# Patient Record
Sex: MALE | Race: WHITE | Employment: FULL TIME | URBAN - METROPOLITAN AREA
[De-identification: names, ages, dates, MRNs, and addresses within clinical notes are randomized per-mention and may not be internally consistent; named-entity substitution may affect disease eponyms.]

---

## 2019-02-06 ENCOUNTER — PATIENT OUTREACH (OUTPATIENT)
Dept: FAMILY MEDICINE CLINIC | Facility: CLINIC | Age: 49
End: 2019-02-06

## 2019-04-30 ENCOUNTER — TELEPHONE (OUTPATIENT)
Dept: FAMILY MEDICINE CLINIC | Facility: CLINIC | Age: 49
End: 2019-04-30

## 2019-05-01 ENCOUNTER — TELEPHONE (OUTPATIENT)
Dept: FAMILY MEDICINE CLINIC | Facility: CLINIC | Age: 49
End: 2019-05-01

## 2019-05-01 NOTE — TELEPHONE ENCOUNTER
Spoke with pt and he verified that Dr. Lopez Arthur is still his PCP. He is also aware that he is due for a physical and will be calling back to schedule an appointment, because he is currently working.

## 2020-08-12 ENCOUNTER — OFFICE VISIT (OUTPATIENT)
Dept: FAMILY MEDICINE CLINIC | Facility: CLINIC | Age: 50
End: 2020-08-12
Payer: COMMERCIAL

## 2020-08-12 VITALS
SYSTOLIC BLOOD PRESSURE: 118 MMHG | WEIGHT: 193 LBS | HEIGHT: 66 IN | TEMPERATURE: 99 F | BODY MASS INDEX: 31.02 KG/M2 | HEART RATE: 78 BPM | RESPIRATION RATE: 16 BRPM | OXYGEN SATURATION: 98 % | DIASTOLIC BLOOD PRESSURE: 82 MMHG

## 2020-08-12 DIAGNOSIS — Z00.00 ANNUAL PHYSICAL EXAM: Primary | ICD-10-CM

## 2020-08-12 DIAGNOSIS — Z20.822 CLOSE EXPOSURE TO COVID-19 VIRUS: ICD-10-CM

## 2020-08-12 DIAGNOSIS — E78.2 MIXED HYPERLIPIDEMIA: ICD-10-CM

## 2020-08-12 DIAGNOSIS — Z00.00 LABORATORY EXAMINATION ORDERED AS PART OF A ROUTINE GENERAL MEDICAL EXAMINATION: ICD-10-CM

## 2020-08-12 DIAGNOSIS — E78.5 HYPERLIPIDEMIA WITH TARGET LOW DENSITY LIPOPROTEIN (LDL) CHOLESTEROL LESS THAN 130 MG/DL: ICD-10-CM

## 2020-08-12 DIAGNOSIS — R53.83 OTHER FATIGUE: ICD-10-CM

## 2020-08-12 DIAGNOSIS — Z80.42 FAMILY HISTORY OF PROSTATE CANCER: ICD-10-CM

## 2020-08-12 DIAGNOSIS — Z12.5 SCREENING PSA (PROSTATE SPECIFIC ANTIGEN): ICD-10-CM

## 2020-08-12 PROCEDURE — 3074F SYST BP LT 130 MM HG: CPT | Performed by: FAMILY MEDICINE

## 2020-08-12 PROCEDURE — 3008F BODY MASS INDEX DOCD: CPT | Performed by: FAMILY MEDICINE

## 2020-08-12 PROCEDURE — 99396 PREV VISIT EST AGE 40-64: CPT | Performed by: FAMILY MEDICINE

## 2020-08-12 PROCEDURE — 99214 OFFICE O/P EST MOD 30 MIN: CPT | Performed by: FAMILY MEDICINE

## 2020-08-12 PROCEDURE — 3079F DIAST BP 80-89 MM HG: CPT | Performed by: FAMILY MEDICINE

## 2020-08-13 ENCOUNTER — APPOINTMENT (OUTPATIENT)
Dept: LAB | Age: 50
End: 2020-08-13
Attending: FAMILY MEDICINE
Payer: COMMERCIAL

## 2020-08-13 DIAGNOSIS — Z20.822 CLOSE EXPOSURE TO COVID-19 VIRUS: ICD-10-CM

## 2020-08-13 DIAGNOSIS — R53.83 OTHER FATIGUE: ICD-10-CM

## 2020-08-13 DIAGNOSIS — Z12.5 SCREENING PSA (PROSTATE SPECIFIC ANTIGEN): ICD-10-CM

## 2020-08-13 LAB
ALBUMIN SERPL-MCNC: 4.2 G/DL (ref 3.4–5)
ALBUMIN/GLOB SERPL: 1.3 {RATIO} (ref 1–2)
ALP LIVER SERPL-CCNC: 69 U/L (ref 45–117)
ALT SERPL-CCNC: 36 U/L (ref 16–61)
ANION GAP SERPL CALC-SCNC: 3 MMOL/L (ref 0–18)
AST SERPL-CCNC: 16 U/L (ref 15–37)
BASOPHILS # BLD AUTO: 0.08 X10(3) UL (ref 0–0.2)
BASOPHILS NFR BLD AUTO: 1.2 %
BILIRUB SERPL-MCNC: 0.5 MG/DL (ref 0.1–2)
BUN BLD-MCNC: 19 MG/DL (ref 7–18)
BUN/CREAT SERPL: 18.6 (ref 10–20)
CALCIUM BLD-MCNC: 9 MG/DL (ref 8.5–10.1)
CHLORIDE SERPL-SCNC: 108 MMOL/L (ref 98–112)
CHOLEST SMN-MCNC: 199 MG/DL (ref ?–200)
CO2 SERPL-SCNC: 29 MMOL/L (ref 21–32)
COMPLEXED PSA SERPL-MCNC: 1.73 NG/ML (ref ?–4)
CREAT BLD-MCNC: 1.02 MG/DL (ref 0.7–1.3)
DEPRECATED RDW RBC AUTO: 43.3 FL (ref 35.1–46.3)
EOSINOPHIL # BLD AUTO: 0.3 X10(3) UL (ref 0–0.7)
EOSINOPHIL NFR BLD AUTO: 4.6 %
ERYTHROCYTE [DISTWIDTH] IN BLOOD BY AUTOMATED COUNT: 12.5 % (ref 11–15)
FOLATE SERPL-MCNC: 10.5 NG/ML (ref 8.7–?)
GLOBULIN PLAS-MCNC: 3.2 G/DL (ref 2.8–4.4)
GLUCOSE BLD-MCNC: 105 MG/DL (ref 70–99)
HCT VFR BLD AUTO: 45.9 % (ref 39–53)
HDLC SERPL-MCNC: 35 MG/DL (ref 40–59)
HGB BLD-MCNC: 15.4 G/DL (ref 13–17.5)
IMM GRANULOCYTES # BLD AUTO: 0.02 X10(3) UL (ref 0–1)
IMM GRANULOCYTES NFR BLD: 0.3 %
LDLC SERPL CALC-MCNC: 136 MG/DL (ref ?–100)
LYMPHOCYTES # BLD AUTO: 2.08 X10(3) UL (ref 1–4)
LYMPHOCYTES NFR BLD AUTO: 32.1 %
M PROTEIN MFR SERPL ELPH: 7.4 G/DL (ref 6.4–8.2)
MCH RBC QN AUTO: 31.5 PG (ref 26–34)
MCHC RBC AUTO-ENTMCNC: 33.6 G/DL (ref 31–37)
MCV RBC AUTO: 93.9 FL (ref 80–100)
MONOCYTES # BLD AUTO: 0.53 X10(3) UL (ref 0.1–1)
MONOCYTES NFR BLD AUTO: 8.2 %
NEUTROPHILS # BLD AUTO: 3.47 X10 (3) UL (ref 1.5–7.7)
NEUTROPHILS # BLD AUTO: 3.47 X10(3) UL (ref 1.5–7.7)
NEUTROPHILS NFR BLD AUTO: 53.6 %
NONHDLC SERPL-MCNC: 164 MG/DL (ref ?–130)
OSMOLALITY SERPL CALC.SUM OF ELEC: 293 MOSM/KG (ref 275–295)
PATIENT FASTING Y/N/NP: YES
PATIENT FASTING Y/N/NP: YES
PLATELET # BLD AUTO: 371 10(3)UL (ref 150–450)
POTASSIUM SERPL-SCNC: 4.2 MMOL/L (ref 3.5–5.1)
RBC # BLD AUTO: 4.89 X10(6)UL (ref 4.3–5.7)
SARS-COV-2 IGG SERPLBLD QL IA.RAPID: NEGATIVE
SODIUM SERPL-SCNC: 140 MMOL/L (ref 136–145)
T4 FREE SERPL-MCNC: 1 NG/DL (ref 0.8–1.7)
TRIGL SERPL-MCNC: 140 MG/DL (ref 30–149)
TSI SER-ACNC: 0.72 MIU/ML (ref 0.36–3.74)
VIT B12 SERPL-MCNC: 332 PG/ML (ref 193–986)
VLDLC SERPL CALC-MCNC: 28 MG/DL (ref 0–30)
WBC # BLD AUTO: 6.5 X10(3) UL (ref 4–11)

## 2020-08-13 PROCEDURE — 86769 SARS-COV-2 COVID-19 ANTIBODY: CPT | Performed by: FAMILY MEDICINE

## 2020-08-13 PROCEDURE — 84403 ASSAY OF TOTAL TESTOSTERONE: CPT | Performed by: FAMILY MEDICINE

## 2020-08-13 PROCEDURE — G0103 PSA SCREENING: HCPCS | Performed by: FAMILY MEDICINE

## 2020-08-13 PROCEDURE — 36415 COLL VENOUS BLD VENIPUNCTURE: CPT | Performed by: FAMILY MEDICINE

## 2020-08-13 PROCEDURE — 84402 ASSAY OF FREE TESTOSTERONE: CPT | Performed by: FAMILY MEDICINE

## 2020-08-13 PROCEDURE — 84439 ASSAY OF FREE THYROXINE: CPT | Performed by: FAMILY MEDICINE

## 2020-08-14 NOTE — PROGRESS NOTES
Deepa King is a 54year old male. HPI:   Patient presenting for annual physical and f/u with fatigue, worsening fatigue. Patient reports worsening epigastric pain with radiation to his back.   Patient reports it's worse after meals and better w on exertion  GI: denies abdominal pain and he reports heartburn  NEURO: denies headaches  PSYCH:  Appropriate mood and what's mentioned above. Anxiety as stated above too. Denies depressed mood.      EXAM:   /82   Pulse 78   Temp 98.9 °F (37.2 °C) ( Meds & Refills for this Visit:  Requested Prescriptions      No prescriptions requested or ordered in this encounter          The patient indicates understanding of these issues and agrees to the plan. The patient is asked to return in 1-2 weeks.

## 2020-08-18 ENCOUNTER — PATIENT MESSAGE (OUTPATIENT)
Dept: FAMILY MEDICINE CLINIC | Facility: CLINIC | Age: 50
End: 2020-08-18

## 2020-08-18 ENCOUNTER — TELEPHONE (OUTPATIENT)
Dept: FAMILY MEDICINE CLINIC | Facility: CLINIC | Age: 50
End: 2020-08-18

## 2020-08-18 NOTE — TELEPHONE ENCOUNTER
From: Bruce Hernadez  To:  Cheli Donato MD  Sent: 8/18/2020 9:09 AM CDT  Subject: Test Results Question    I have a question about VITAMIN B12 resulted on 8/13/20, 1:12 PM.    Good morning doctor     Which one of these test are the results for testosteron

## 2020-08-18 NOTE — TELEPHONE ENCOUNTER
----- Message from Anastasia Cheadle, MD sent at 8/17/2020 12:56 PM CDT -----  B12 is low. Needs b12 1000 mcg IM every other week for 3 months, then once a month until recheck b12 and folate in 6 months.      Also, let him know that I recommend he see Yakov nj

## 2020-08-18 NOTE — TELEPHONE ENCOUNTER
----- Message from Mayela Charlton MD sent at 8/17/2020 12:57 PM CDT -----  Negative immunity covid.

## 2020-08-18 NOTE — TELEPHONE ENCOUNTER
Spoke to pt with test results/instructions. Gave pt Dany's contact information [phone]. Pt scheduled for nurse visit for tomorrow.   He would like to see if he can learn how to give the B12 injection to himself then moving forward he will do them at

## 2020-08-19 ENCOUNTER — NURSE ONLY (OUTPATIENT)
Dept: FAMILY MEDICINE CLINIC | Facility: CLINIC | Age: 50
End: 2020-08-19
Payer: COMMERCIAL

## 2020-08-19 ENCOUNTER — TELEPHONE (OUTPATIENT)
Dept: FAMILY MEDICINE CLINIC | Facility: CLINIC | Age: 50
End: 2020-08-19

## 2020-08-19 DIAGNOSIS — E53.8 VITAMIN B12 DEFICIENCY: Primary | ICD-10-CM

## 2020-08-19 DIAGNOSIS — R79.89 LOW TESTOSTERONE IN MALE: Primary | ICD-10-CM

## 2020-08-19 LAB
TESTOSTERONE TOTAL: 236.8 NG/DL
TESTOSTERONE, FREE -MS/MS: 66.2 PG/ML

## 2020-08-19 PROCEDURE — 96372 THER/PROPH/DIAG INJ SC/IM: CPT | Performed by: FAMILY MEDICINE

## 2020-08-19 RX ORDER — CYANOCOBALAMIN 1000 UG/ML
1000 INJECTION INTRAMUSCULAR; SUBCUTANEOUS ONCE
Status: COMPLETED | OUTPATIENT
Start: 2020-08-19 | End: 2020-08-19

## 2020-08-19 RX ADMIN — CYANOCOBALAMIN 1000 MCG: 1000 INJECTION INTRAMUSCULAR; SUBCUTANEOUS at 15:55:00

## 2020-08-19 NOTE — TELEPHONE ENCOUNTER
Patient was seen in office for a nurse visit and got a b12 shot. Patient asked for medication to be sent to his pharmacy so he can administer at home. Please advise.

## 2020-08-19 NOTE — TELEPHONE ENCOUNTER
----- Message from Cheli Donato MD sent at 8/19/2020 12:46 PM CDT -----  Low normal. Ok for treatment if he prefers, refer to [facility].     tracy sent and referral in system

## 2020-08-20 RX ORDER — CYANOCOBALAMIN 1000 UG/ML
INJECTION INTRAMUSCULAR; SUBCUTANEOUS
Qty: 9 ML | Refills: 0 | Status: SHIPPED | OUTPATIENT
Start: 2020-08-20 | End: 2020-12-01

## 2020-09-14 ENCOUNTER — TELEPHONE (OUTPATIENT)
Dept: SURGERY | Facility: CLINIC | Age: 50
End: 2020-09-14

## 2020-09-14 ENCOUNTER — OFFICE VISIT (OUTPATIENT)
Dept: SURGERY | Facility: CLINIC | Age: 50
End: 2020-09-14
Payer: COMMERCIAL

## 2020-09-14 VITALS — HEART RATE: 84 BPM | SYSTOLIC BLOOD PRESSURE: 127 MMHG | DIASTOLIC BLOOD PRESSURE: 87 MMHG

## 2020-09-14 DIAGNOSIS — Z80.42 FAMILY HISTORY OF PROSTATE CANCER: ICD-10-CM

## 2020-09-14 DIAGNOSIS — R06.83 SNORING: ICD-10-CM

## 2020-09-14 DIAGNOSIS — E29.1 HYPOGONADISM IN MALE: Primary | ICD-10-CM

## 2020-09-14 LAB
MULTISTIX LOT#: 1044 NUMERIC
PH, URINE: 7.5 (ref 4.5–8)
SPECIFIC GRAVITY: 1.02 (ref 1–1.03)
URINE-COLOR: YELLOW
UROBILINOGEN,SEMI-QN: 1 MG/DL (ref 0–1.9)

## 2020-09-14 PROCEDURE — 81003 URINALYSIS AUTO W/O SCOPE: CPT | Performed by: UROLOGY

## 2020-09-14 PROCEDURE — 3079F DIAST BP 80-89 MM HG: CPT | Performed by: UROLOGY

## 2020-09-14 PROCEDURE — 3074F SYST BP LT 130 MM HG: CPT | Performed by: UROLOGY

## 2020-09-14 PROCEDURE — 99244 OFF/OP CNSLTJ NEW/EST MOD 40: CPT | Performed by: UROLOGY

## 2020-09-14 RX ORDER — TESTOSTERONE CYPIONATE 200 MG/ML
1 VIAL (ML) INTRAMUSCULAR
Qty: 2 ML | Refills: 5 | Status: SHIPPED | OUTPATIENT
Start: 2020-09-14 | End: 2021-02-12
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
## 2020-09-15 NOTE — TELEPHONE ENCOUNTER
RN called [address] Spoke to Deja Weir. She was asking if instead of Syringe 22Gx1 3ml (use to draw), if they can dispense Syringe 22Gx1. 5 3ml instead. RN approved the changed.

## 2020-09-17 ENCOUNTER — LAB ENCOUNTER (OUTPATIENT)
Dept: LAB | Age: 50
End: 2020-09-17
Attending: UROLOGY
Payer: COMMERCIAL

## 2020-09-17 DIAGNOSIS — E29.1 MALE HYPOGONADISM: Primary | ICD-10-CM

## 2020-09-17 LAB
DEPRECATED RDW RBC AUTO: 41 FL (ref 35.1–46.3)
ERYTHROCYTE [DISTWIDTH] IN BLOOD BY AUTOMATED COUNT: 12.4 % (ref 11–15)
ESTRADIOL SERPL-MCNC: 43.3 PG/ML (ref 11–52.5)
HCT VFR BLD AUTO: 43.9 % (ref 39–53)
HGB BLD-MCNC: 15.3 G/DL (ref 13–17.5)
MCH RBC QN AUTO: 31.7 PG (ref 26–34)
MCHC RBC AUTO-ENTMCNC: 34.9 G/DL (ref 31–37)
MCV RBC AUTO: 90.9 FL (ref 80–100)
PLATELET # BLD AUTO: 361 10(3)UL (ref 150–450)
PSA SERPL-MCNC: 1.53 NG/ML (ref ?–4)
RBC # BLD AUTO: 4.83 X10(6)UL (ref 4.3–5.7)
TESTOST SERPL-MCNC: 171.86 NG/DL
WBC # BLD AUTO: 6.5 X10(3) UL (ref 4–11)

## 2020-09-17 PROCEDURE — 82670 ASSAY OF TOTAL ESTRADIOL: CPT | Performed by: UROLOGY

## 2020-09-17 PROCEDURE — 36415 COLL VENOUS BLD VENIPUNCTURE: CPT | Performed by: UROLOGY

## 2020-09-17 PROCEDURE — 84403 ASSAY OF TOTAL TESTOSTERONE: CPT

## 2020-09-17 PROCEDURE — 85027 COMPLETE CBC AUTOMATED: CPT | Performed by: UROLOGY

## 2020-09-17 PROCEDURE — 84153 ASSAY OF PSA TOTAL: CPT | Performed by: UROLOGY

## 2020-09-29 ENCOUNTER — TELEPHONE (OUTPATIENT)
Dept: SURGERY | Facility: CLINIC | Age: 50
End: 2020-09-29

## 2020-09-29 NOTE — TELEPHONE ENCOUNTER
Rn received a fax letter from [address] [place] [address] the clinic that the Sleep studis (service) requested have NOT been approved. RN called patient. Left message.

## 2020-10-09 ENCOUNTER — TELEPHONE (OUTPATIENT)
Dept: SURGERY | Facility: CLINIC | Age: 50
End: 2020-10-09

## 2020-10-09 DIAGNOSIS — R79.89 LOW TESTOSTERONE IN MALE: Primary | ICD-10-CM

## 2020-10-09 DIAGNOSIS — F43.10 PTSD (POST-TRAUMATIC STRESS DISORDER): ICD-10-CM

## 2020-10-09 NOTE — TELEPHONE ENCOUNTER
Called patient regarding diagnostic sleep study, please see referral   [id number]  It was denied by health plan see below    Called health plan per katrin richard ref# [id number] Denied per health plan a home sleep requires no auth     Denial fax letter was faxed to provider on 9/25/20       Please advise if home sleep study can be ordered.     Thanks    [facility]

## 2020-10-14 NOTE — TELEPHONE ENCOUNTER
Home sleep study approved left message with patient to contact managed [facility] or ordering physican office    See referral [id number]    Thanks for your patience and cooperation    [facility]- Managed care

## 2020-10-30 ENCOUNTER — PATIENT MESSAGE (OUTPATIENT)
Dept: SURGERY | Facility: CLINIC | Age: 50
End: 2020-10-30

## 2020-11-02 ENCOUNTER — TELEPHONE (OUTPATIENT)
Dept: SURGERY | Facility: CLINIC | Age: 50
End: 2020-11-02

## 2020-11-02 RX ORDER — TESTOSTERONE CYPIONATE 100 MG/ML
100 INJECTION, SOLUTION INTRAMUSCULAR
Qty: 4 ML | Refills: 5 | Status: SHIPPED | OUTPATIENT
Start: 2020-11-02 | End: 2021-04-06

## 2020-11-02 NOTE — TELEPHONE ENCOUNTER
Per Rafe Dakins needs to clarify rx testosterone cypionate 100MG/ML. Past rx was 200mg and wants to verify dr wanted it lowered. Per Rafe Dakins does not come in 100mg, single dose, only 10 mg at a time.  Please advise

## 2020-11-02 NOTE — TELEPHONE ENCOUNTER
Yes, he can cut the dose in half and take once weekly. I sent in a new Rx for the weekly dose he can  from the pharmacy if he needs to.     Thanks,  MPH

## 2020-11-02 NOTE — TELEPHONE ENCOUNTER
From: Danielle Cuevas  To: Anna Bernstein MD  Sent: 10/30/2020 2:14 PM CDT  Subject: Visit Follow-up Question    Good afternoon Doctor,    The shot are working good but only for a short term.  After a bit over a week I get very tired again, to the point wer

## 2020-11-03 NOTE — TELEPHONE ENCOUNTER
Spoke with Leo Toro at [facility] and informed her that the dose of testosterone has been lowered to 100 ml weekly. States the med does not come in a 100mg/1ml vial only 10ml. Okay given to dispense 10ml vial.  Verbalized understanding.

## 2020-12-02 RX ORDER — NEEDLES, SAFETY 18GX1 1/2"
NEEDLE, DISPOSABLE MISCELLANEOUS
Qty: 6 EACH | Refills: 0 | Status: SHIPPED | OUTPATIENT
Start: 2020-12-02 | End: 2021-04-20 | Stop reason: ALTCHOICE

## 2020-12-02 RX ORDER — CYANOCOBALAMIN 1000 UG/ML
INJECTION INTRAMUSCULAR; SUBCUTANEOUS
Qty: 6 ML | Refills: 0 | Status: SHIPPED | OUTPATIENT
Start: 2020-12-02 | End: 2021-02-12

## 2020-12-04 ENCOUNTER — TELEPHONE (OUTPATIENT)
Dept: SURGERY | Facility: CLINIC | Age: 50
End: 2020-12-04

## 2020-12-04 DIAGNOSIS — E29.1 HYPOGONADISM IN MALE: Primary | ICD-10-CM

## 2020-12-04 NOTE — TELEPHONE ENCOUNTER
Patient contacted. Relayed Dr. Pepe Enciso orders/recommendations below to the patient. Patient verbalized understanding and states he will comply. All questions answered.

## 2020-12-04 NOTE — TELEPHONE ENCOUNTER
Hi,  Could you please call this patient and see if he could get the following tests prior to their upcoming appointment. - Testosterone labs    If they can't get this done that's fine.     Thanks,  MPH
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
## 2020-12-10 ENCOUNTER — TELEPHONE (OUTPATIENT)
Dept: SURGERY | Facility: CLINIC | Age: 50
End: 2020-12-10

## 2020-12-10 NOTE — TELEPHONE ENCOUNTER
RN called patient to remind of his appt on 12/14 Monday at 4pm with labs prior. Central Scheduling number to make an appt for his lab draw.

## 2020-12-12 ENCOUNTER — LAB ENCOUNTER (OUTPATIENT)
Dept: LAB | Facility: HOSPITAL | Age: 50
End: 2020-12-12
Attending: UROLOGY
Payer: COMMERCIAL

## 2020-12-12 DIAGNOSIS — E29.1 HYPOGONADISM IN MALE: Primary | ICD-10-CM

## 2020-12-12 PROCEDURE — 82670 ASSAY OF TOTAL ESTRADIOL: CPT | Performed by: UROLOGY

## 2020-12-12 PROCEDURE — 36415 COLL VENOUS BLD VENIPUNCTURE: CPT

## 2020-12-12 PROCEDURE — 84403 ASSAY OF TOTAL TESTOSTERONE: CPT

## 2020-12-12 PROCEDURE — 85027 COMPLETE CBC AUTOMATED: CPT | Performed by: UROLOGY

## 2020-12-14 ENCOUNTER — OFFICE VISIT (OUTPATIENT)
Dept: SURGERY | Facility: CLINIC | Age: 50
End: 2020-12-14
Payer: COMMERCIAL

## 2020-12-14 DIAGNOSIS — R82.90 URINE FINDING: ICD-10-CM

## 2020-12-14 DIAGNOSIS — E29.1 HYPOGONADISM IN MALE: Primary | ICD-10-CM

## 2020-12-14 DIAGNOSIS — F43.10 PTSD (POST-TRAUMATIC STRESS DISORDER): ICD-10-CM

## 2020-12-14 DIAGNOSIS — R06.83 SNORING: ICD-10-CM

## 2020-12-14 DIAGNOSIS — N52.9 ERECTILE DYSFUNCTION, UNSPECIFIED ERECTILE DYSFUNCTION TYPE: ICD-10-CM

## 2020-12-14 PROCEDURE — 81003 URINALYSIS AUTO W/O SCOPE: CPT | Performed by: UROLOGY

## 2020-12-14 PROCEDURE — 99214 OFFICE O/P EST MOD 30 MIN: CPT | Performed by: UROLOGY

## 2020-12-14 RX ORDER — TESTOSTERONE CYPIONATE 100 MG/ML
100 INJECTION, SOLUTION INTRAMUSCULAR
Qty: 4 ML | Refills: 5 | Status: SHIPPED | OUTPATIENT
Start: 2020-12-14 | End: 2021-02-10

## 2020-12-14 RX ORDER — SILDENAFIL 100 MG/1
100 TABLET, FILM COATED ORAL
Qty: 30 TABLET | Refills: 5 | Status: SHIPPED | OUTPATIENT
Start: 2020-12-14 | End: 2020-12-15

## 2020-12-15 DIAGNOSIS — E29.1 HYPOGONADISM IN MALE: ICD-10-CM

## 2020-12-15 DIAGNOSIS — N52.9 ERECTILE DYSFUNCTION, UNSPECIFIED ERECTILE DYSFUNCTION TYPE: ICD-10-CM

## 2020-12-15 RX ORDER — SILDENAFIL 100 MG/1
100 TABLET, FILM COATED ORAL
Qty: 10 TABLET | Refills: 3 | Status: SHIPPED | OUTPATIENT
Start: 2020-12-15 | End: 2021-06-07

## 2020-12-15 RX ORDER — CYANOCOBALAMIN 1000 UG/ML
INJECTION, SOLUTION INTRAMUSCULAR; SUBCUTANEOUS
Qty: 6 ML | Refills: 0 | OUTPATIENT
Start: 2020-12-15

## 2020-12-15 NOTE — TELEPHONE ENCOUNTER
Erectile dysfunction medications    Protocol Criteria:  • Appointment scheduled in the past 12 months or in the next 2 months  • Patient is not on nitrates (Isosorbide, nitroglycerin)    Recent Outpatient Visits            Yesterday Hypogonadism in male

## 2020-12-16 RX ORDER — TESTOSTERONE CYPIONATE 100 MG/ML
100 INJECTION, SOLUTION INTRAMUSCULAR
Qty: 4 ML | Refills: 5 | OUTPATIENT
Start: 2020-12-16 | End: 2021-06-14

## 2020-12-16 NOTE — TELEPHONE ENCOUNTER
Requesting refill of Testosterone IM. [facility] please advise, thanks    LOV: 12/14/2020     - Will refill T, sleep study ordered, CBC in 3 months. F/u in 6 mo with labs prior. 100 mg viagra with coupon sent to [facility].

## 2020-12-16 NOTE — TELEPHONE ENCOUNTER
Pharm confirms that the pt has more refill of Testosterone IM. New refill request will be denied because it is too soon.

## 2020-12-17 ENCOUNTER — PATIENT MESSAGE (OUTPATIENT)
Dept: FAMILY MEDICINE CLINIC | Facility: CLINIC | Age: 50
End: 2020-12-17

## 2020-12-17 DIAGNOSIS — E53.8 VITAMIN B12 DEFICIENCY: Primary | ICD-10-CM

## 2020-12-17 RX ORDER — CYANOCOBALAMIN 1000 UG/ML
INJECTION INTRAMUSCULAR; SUBCUTANEOUS
Qty: 6 ML | Refills: 0 | OUTPATIENT
Start: 2020-12-17

## 2020-12-17 NOTE — TELEPHONE ENCOUNTER
From 8/13/2020 lab result: \"B12 is low.  Needs b12 1000 mcg IM every other week for 3 months, then once a month until recheck b12 and folate in 6 months. \" E94 and folic acid orders placed for Feb 2021. Pt notified via [employer].

## 2020-12-17 NOTE — TELEPHONE ENCOUNTER
From: Nohelia Sal  To: Enrique Spear MD  Sent: 12/17/2020 8:51 AM CST  Subject: Prescription Question    Good morning     I think the b12 is working good and am hoping to continue.  Can you please renew the prescription or was this a temporary thing

## 2021-02-02 ENCOUNTER — OFFICE VISIT (OUTPATIENT)
Dept: SLEEP CENTER | Age: 51
End: 2021-02-02
Attending: UROLOGY
Payer: COMMERCIAL

## 2021-02-02 DIAGNOSIS — F43.10 PTSD (POST-TRAUMATIC STRESS DISORDER): ICD-10-CM

## 2021-02-02 DIAGNOSIS — R79.89 LOW TESTOSTERONE IN MALE: ICD-10-CM

## 2021-02-02 PROCEDURE — 95806 SLEEP STUDY UNATT&RESP EFFT: CPT

## 2021-02-04 NOTE — TELEPHONE ENCOUNTER
Medication(s) to Refill:   Requested Prescriptions     Pending Prescriptions Disp Refills   • cyanocobalamin 1000 MCG/ML Injection Solution 6 mL 0     Si MCG once monthly for 6 months         Reason for Medication Refill being sent to Provider / Frank Amezquita

## 2021-02-05 ENCOUNTER — SLEEP STUDY (OUTPATIENT)
Dept: FAMILY MEDICINE CLINIC | Facility: CLINIC | Age: 51
End: 2021-02-05
Payer: COMMERCIAL

## 2021-02-05 ENCOUNTER — TELEPHONE (OUTPATIENT)
Dept: FAMILY MEDICINE CLINIC | Facility: CLINIC | Age: 51
End: 2021-02-05

## 2021-02-05 DIAGNOSIS — G47.33 OBSTRUCTIVE SLEEP APNEA SYNDROME: Primary | ICD-10-CM

## 2021-02-05 PROCEDURE — 95806 SLEEP STUDY UNATT&RESP EFFT: CPT | Performed by: FAMILY MEDICINE

## 2021-02-05 RX ORDER — CYANOCOBALAMIN 1000 UG/ML
INJECTION INTRAMUSCULAR; SUBCUTANEOUS
Qty: 6 ML | Refills: 0 | OUTPATIENT
Start: 2021-02-05

## 2021-02-05 NOTE — PROGRESS NOTES
Please notify patient sleep study is read. Update flow sheet   Schedule consult   Sleep hygiene should be review to assess factors that may improve sleep quality. Weight management and regular exercise should be initiated or continued to optimal BMI.

## 2021-02-05 NOTE — PROCEDURES
[address] [address],[facility] [address]       Accredited by the [facility] ([facility])    PATIENT'S NAME:        [facility]  ATTENDING PHYSICIAN:   Belia Vigil MD  REFERRING PHYSICIAN:   Angel Luis Mendieta MD  PATIENT 10:38:33  t: 02/05/2021 10:55:38  [facility] [id number]  [facility]/    cc: Luh Harrington MD

## 2021-02-05 NOTE — TELEPHONE ENCOUNTER
LM for patient to return call for results of sleep study. He will need consult scheduled, which may be virtual, to discuss an in-lab PAP titration. Flowsheet updated.

## 2021-02-05 NOTE — TELEPHONE ENCOUNTER
----- Message from Denis Franco MD sent at 2/5/2021 11:34 AM CST -----  Please notify patient sleep study is read. Update flow sheet   Schedule consult   Sleep hygiene should be review to assess factors that may improve sleep quality.     Weight managemen

## 2021-02-10 DIAGNOSIS — E29.1 HYPOGONADISM IN MALE: ICD-10-CM

## 2021-02-11 RX ORDER — TESTOSTERONE CYPIONATE 100 MG/ML
100 INJECTION, SOLUTION INTRAMUSCULAR
Qty: 4 ML | Refills: 5 | Status: SHIPPED | OUTPATIENT
Start: 2021-02-11 | End: 2021-02-12

## 2021-02-11 NOTE — TELEPHONE ENCOUNTER
Patient is requesting refill of testosterone. [facility] please advise, thanks    LOV: 12/14/2020    Component      Latest Ref Rng & Units 12/12/2020 9/17/2020 8/13/2020   TESTOSTERONE, FREE LC-MS/MS      47.0 - 244.0 pg/mL   66.2   Testosterone Total      300. 0

## 2021-02-12 ENCOUNTER — OFFICE VISIT (OUTPATIENT)
Dept: FAMILY MEDICINE CLINIC | Facility: CLINIC | Age: 51
End: 2021-02-12
Payer: COMMERCIAL

## 2021-02-12 ENCOUNTER — TELEPHONE (OUTPATIENT)
Dept: SURGERY | Facility: CLINIC | Age: 51
End: 2021-02-12

## 2021-02-12 VITALS
TEMPERATURE: 97 F | RESPIRATION RATE: 18 BRPM | SYSTOLIC BLOOD PRESSURE: 120 MMHG | HEIGHT: 66 IN | WEIGHT: 209 LBS | HEART RATE: 85 BPM | OXYGEN SATURATION: 98 % | BODY MASS INDEX: 33.59 KG/M2 | DIASTOLIC BLOOD PRESSURE: 80 MMHG

## 2021-02-12 DIAGNOSIS — E29.1 HYPOGONADISM IN MALE: ICD-10-CM

## 2021-02-12 DIAGNOSIS — E78.2 MIXED HYPERLIPIDEMIA: ICD-10-CM

## 2021-02-12 DIAGNOSIS — K21.9 GASTROESOPHAGEAL REFLUX DISEASE WITHOUT ESOPHAGITIS: ICD-10-CM

## 2021-02-12 DIAGNOSIS — E53.8 B12 DEFICIENCY: Primary | ICD-10-CM

## 2021-02-12 PROCEDURE — 3079F DIAST BP 80-89 MM HG: CPT | Performed by: FAMILY MEDICINE

## 2021-02-12 PROCEDURE — 3074F SYST BP LT 130 MM HG: CPT | Performed by: FAMILY MEDICINE

## 2021-02-12 PROCEDURE — 99214 OFFICE O/P EST MOD 30 MIN: CPT | Performed by: FAMILY MEDICINE

## 2021-02-12 PROCEDURE — 3008F BODY MASS INDEX DOCD: CPT | Performed by: FAMILY MEDICINE

## 2021-02-12 RX ORDER — CYANOCOBALAMIN 1000 UG/ML
INJECTION INTRAMUSCULAR; SUBCUTANEOUS
Qty: 6 ML | Refills: 0 | Status: SHIPPED | OUTPATIENT
Start: 2021-02-12 | End: 2021-07-26

## 2021-02-12 NOTE — TELEPHONE ENCOUNTER
I called the pharm and they stated that they do not have 4mL vial of testosterone and they were asking fit he 10 mL would be okay. I discussed that it would be. They verbalized understanding and all questions were answered.

## 2021-02-15 NOTE — PROGRESS NOTES
HPI:    Moises Erickson is a 52year old male who presents for Follow - Up (fatigue, b12 discussion )     Presenting for follow-up.   Patient initially saw me for worsening fatigue was started on testosterone with some improvement also on B12 injections ha or chills. Patient reports no urinary complaints and denies headaches or visual disturbances. Patient denies any abdominal pain at this time. Patient has no new skin lesions. Patient reports no acute back pain and reports no dizziness or headaches. AND PLAN:   Diagnoses and all orders for this visit:    B12 deficiency  -     cyanocobalamin 1000 MCG/ML Injection Solution; 1000 MCG twice monthly for 3 months    Hypogonadism in male    Mixed hyperlipidemia    Gastroesophageal reflux disease without esop

## 2021-02-19 ENCOUNTER — HOSPITAL ENCOUNTER (OUTPATIENT)
Age: 51
Discharge: HOME | End: 2021-02-19
Payer: COMMERCIAL

## 2021-02-19 ENCOUNTER — APPOINTMENT (OUTPATIENT)
Dept: CT IMAGING | Age: 51
End: 2021-02-19
Attending: PHYSICIAN ASSISTANT
Payer: COMMERCIAL

## 2021-02-19 VITALS
DIASTOLIC BLOOD PRESSURE: 82 MMHG | TEMPERATURE: 98 F | RESPIRATION RATE: 18 BRPM | HEART RATE: 100 BPM | OXYGEN SATURATION: 97 % | SYSTOLIC BLOOD PRESSURE: 128 MMHG

## 2021-02-19 DIAGNOSIS — R10.32 ABDOMINAL PAIN, LEFT LOWER QUADRANT: Primary | ICD-10-CM

## 2021-02-19 DIAGNOSIS — K57.92 ACUTE DIVERTICULITIS: ICD-10-CM

## 2021-02-19 LAB
#MXD IC: 1.2 X10ˆ3/UL (ref 0.1–1)
CREAT BLD-MCNC: 1.2 MG/DL
GLUCOSE BLD-MCNC: 106 MG/DL (ref 70–99)
HCT VFR BLD AUTO: 51.4 %
HGB BLD-MCNC: 17.5 G/DL
ISTAT BUN: 14 MG/DL (ref 7–18)
ISTAT CHLORIDE: 102 MMOL/L (ref 98–112)
ISTAT HEMATOCRIT: 52 %
ISTAT IONIZED CALCIUM FOR CHEM 8: 1.16 MMOL/L (ref 1.12–1.32)
ISTAT POTASSIUM: 3.8 MMOL/L (ref 3.6–5.1)
ISTAT SODIUM: 141 MMOL/L (ref 136–145)
ISTAT TCO2: 28 MMOL/L (ref 21–32)
LYMPHOCYTES # BLD AUTO: 2.3 X10ˆ3/UL (ref 1–4)
LYMPHOCYTES NFR BLD AUTO: 23.2 %
MCH RBC QN AUTO: 31.2 PG (ref 26–34)
MCHC RBC AUTO-ENTMCNC: 34 G/DL (ref 31–37)
MCV RBC AUTO: 91.6 FL (ref 80–100)
MIXED CELL %: 12.2 %
NEUTROPHILS # BLD AUTO: 6.4 X10ˆ3/UL (ref 1.5–7.7)
NEUTROPHILS NFR BLD AUTO: 64.6 %
PLATELET # BLD AUTO: 386 X10ˆ3/UL (ref 150–450)
POCT GLUCOSE URINE: NEGATIVE MG/DL
POCT KETONE URINE: 15 MG/DL
POCT LEUKOCYTE ESTERASE URINE: NEGATIVE
POCT NITRITE URINE: NEGATIVE
POCT PH URINE: 6.5 (ref 5–8)
POCT SPECIFIC GRAVITY URINE: 1.02
POCT URINE CLARITY: CLEAR
POCT UROBILINOGEN URINE: 0.2 MG/DL
RBC # BLD AUTO: 5.61 X10ˆ6/UL
WBC # BLD AUTO: 9.9 X10ˆ3/UL (ref 4–11)

## 2021-02-19 PROCEDURE — 96374 THER/PROPH/DIAG INJ IV PUSH: CPT | Performed by: PHYSICIAN ASSISTANT

## 2021-02-19 PROCEDURE — 80047 BASIC METABLC PNL IONIZED CA: CPT | Performed by: PHYSICIAN ASSISTANT

## 2021-02-19 PROCEDURE — 81002 URINALYSIS NONAUTO W/O SCOPE: CPT | Performed by: PHYSICIAN ASSISTANT

## 2021-02-19 PROCEDURE — 36415 COLL VENOUS BLD VENIPUNCTURE: CPT | Performed by: PHYSICIAN ASSISTANT

## 2021-02-19 PROCEDURE — 74176 CT ABD & PELVIS W/O CONTRAST: CPT | Performed by: PHYSICIAN ASSISTANT

## 2021-02-19 PROCEDURE — 85025 COMPLETE CBC W/AUTO DIFF WBC: CPT | Performed by: PHYSICIAN ASSISTANT

## 2021-02-19 PROCEDURE — 99203 OFFICE O/P NEW LOW 30 MIN: CPT | Performed by: PHYSICIAN ASSISTANT

## 2021-02-19 RX ORDER — KETOROLAC TROMETHAMINE 30 MG/ML
30 INJECTION, SOLUTION INTRAMUSCULAR; INTRAVENOUS ONCE
Status: COMPLETED | OUTPATIENT
Start: 2021-02-19 | End: 2021-02-19

## 2021-02-19 RX ORDER — AMOXICILLIN AND CLAVULANATE POTASSIUM 875; 125 MG/1; MG/1
1 TABLET, FILM COATED ORAL 2 TIMES DAILY
Qty: 20 TABLET | Refills: 0 | Status: SHIPPED | OUTPATIENT
Start: 2021-02-19 | End: 2021-03-01

## 2021-02-19 RX ORDER — SODIUM CHLORIDE 9 MG/ML
INJECTION, SOLUTION INTRAVENOUS ONCE
Status: COMPLETED | OUTPATIENT
Start: 2021-02-19 | End: 2021-02-19

## 2021-02-19 NOTE — ED INITIAL ASSESSMENT (HPI)
Patient states he felt constipated and his entire abd was painful and bloated yesterday. Took Mag Citrate last night and had a large BM. Bloating and pain over entire abd has resolved, but now has a sharp pain in left upper quad.  Denies N/V and any urinary

## 2021-02-19 NOTE — ED PROVIDER NOTES
Patient Seen in: [facility] [address] [place] [address]      History   Patient presents with:  Abdominal Pain    Stated Complaint: pain left waistline    HPI/Subjective:   HPI    Pleasant 72-year-old gentleman.   For the past 2 days the patient has had some bloating sensa exudates or deviation. No stridor to auscultation  Lung: No distress, RR, no retraction, breath sounds are clear bilaterally  Back: Full range of motion, no CVA tenderness  Abdominal: Slightly distended. Moderate pain to palpation left lower quadrant.   O positions. No hydronephrosis. 0.2 cm nonobstructing left renal calculus. ADRENALS:  Not enlarged. AORTA/VASCULAR:    Smooth tapering. No abdominal aortic aneurysm. RETROPERITONEUM:  No adenopathy. BOWEL/MESENTERY:  Normal bowel caliber.   There is mild t Dictated by (CST): Edis Hampton MD on 2/19/2021 at 1:13 PM     Finalized by (CST): Edis Hampton MD on 2/19/2021 at 1:17 PM       CT as above. Discussed clear liquid diet with slow progression with the patient.  For any acute pain, blood in stool, fever report

## 2021-02-22 ENCOUNTER — TELEPHONE (OUTPATIENT)
Dept: FAMILY MEDICINE CLINIC | Facility: CLINIC | Age: 51
End: 2021-02-22

## 2021-02-22 NOTE — TELEPHONE ENCOUNTER
LEXY for patient to return call, and also to schedule a sleep consult visit to discuss sleep study results with RUBY Green or Dr. Parrish Martinez, 2/22.

## 2021-02-23 NOTE — TELEPHONE ENCOUNTER
Future Appointments   Date Time Provider Yoselyn Mensahi   2/25/2021  8:00 AM Shaquille Gillette APRN [facility]    3/12/2021  9:30 AM Reji Soto MD [facility]   6/14/2021  3:30 PM Patti Beckwith MD [facility]

## 2021-02-25 ENCOUNTER — OFFICE VISIT (OUTPATIENT)
Dept: FAMILY MEDICINE CLINIC | Facility: CLINIC | Age: 51
End: 2021-02-25
Payer: COMMERCIAL

## 2021-02-25 VITALS
BODY MASS INDEX: 32.98 KG/M2 | RESPIRATION RATE: 18 BRPM | SYSTOLIC BLOOD PRESSURE: 124 MMHG | OXYGEN SATURATION: 96 % | TEMPERATURE: 98 F | HEART RATE: 96 BPM | HEIGHT: 66 IN | DIASTOLIC BLOOD PRESSURE: 78 MMHG | WEIGHT: 205.19 LBS

## 2021-02-25 DIAGNOSIS — G47.33 OBSTRUCTIVE SLEEP APNEA SYNDROME: Primary | ICD-10-CM

## 2021-02-25 DIAGNOSIS — G47.34 NOCTURNAL HYPOXEMIA: ICD-10-CM

## 2021-02-25 DIAGNOSIS — Z87.820 HISTORY OF TRAUMATIC BRAIN INJURY: ICD-10-CM

## 2021-02-25 PROCEDURE — 3008F BODY MASS INDEX DOCD: CPT | Performed by: NURSE PRACTITIONER

## 2021-02-25 PROCEDURE — 99214 OFFICE O/P EST MOD 30 MIN: CPT | Performed by: NURSE PRACTITIONER

## 2021-02-25 PROCEDURE — 3074F SYST BP LT 130 MM HG: CPT | Performed by: NURSE PRACTITIONER

## 2021-02-25 PROCEDURE — 3078F DIAST BP <80 MM HG: CPT | Performed by: NURSE PRACTITIONER

## 2021-02-25 NOTE — PROGRESS NOTES
[facility]        HPI:   This is a 52year old male coming in for Patient presents with:  Obstructive Sleep Apnea (KYLIE)      HPI:     Patient had a home sleep study due to his snoring.  Wife not sleeping in the same r Yes      Comment: 2x week    Drug use: No    Family History:  Family History   Problem Relation Age of Onset   • Cancer Father         prostate   • Diabetes Mother      Allergies:  No Known Allergies  Current Meds:  Current Outpatient Medications   Medicat 33.12 kg/m²  Estimated body mass index is 33.12 kg/m² as calculated from the following:    Height as of this encounter: 5' 6\" (1.676 m). Weight as of this encounter: 205 lb 3.2 oz (93.1 kg).    Neck in inches: Neck Circumferenece: 19    Wt Readings from GENERAL SLEEP STUDY TRANSCRIPTION; Future    Patient Instructions   Sleep titration study ordered for Soledad. Call [phone] to schedule. After the study is read, we will call you and send an order for the machine and supplies to [facility] Outcome: Parent verbalizes understanding. Parent is notified to call with any questions, complications, allergies, or worsening or changing symptoms. Parent is to call with any side effects or complications from the treatments as a result of today.

## 2021-02-25 NOTE — PATIENT INSTRUCTIONS
Sleep titration study ordered for Soledad. Call [phone] to schedule. After the study is read, we will call you and send an order for the machine and supplies to [facility]      Recheck about 2 weeks after starting to use the machine at home with JULIANO

## 2021-03-01 ENCOUNTER — ORDER TRANSCRIPTION (OUTPATIENT)
Dept: SLEEP CENTER | Age: 51
End: 2021-03-01

## 2021-03-01 DIAGNOSIS — Z11.59 SCREENING FOR VIRAL DISEASE: ICD-10-CM

## 2021-03-01 DIAGNOSIS — Z01.818 PREOP EXAMINATION: Primary | ICD-10-CM

## 2021-03-12 ENCOUNTER — LAB ENCOUNTER (OUTPATIENT)
Dept: LAB | Age: 51
End: 2021-03-12
Attending: NURSE PRACTITIONER
Payer: COMMERCIAL

## 2021-03-12 DIAGNOSIS — Z23 NEED FOR VACCINATION: ICD-10-CM

## 2021-03-12 DIAGNOSIS — Z11.59 SCREENING FOR VIRAL DISEASE: ICD-10-CM

## 2021-03-12 DIAGNOSIS — Z01.818 PREOP EXAMINATION: ICD-10-CM

## 2021-03-12 PROBLEM — K57.92 DIVERTICULITIS: Status: ACTIVE | Noted: 2021-03-12

## 2021-03-12 LAB — SARS-COV-2 RNA RESP QL NAA+PROBE: NOT DETECTED

## 2021-03-13 ENCOUNTER — TELEPHONE (OUTPATIENT)
Dept: FAMILY MEDICINE CLINIC | Facility: CLINIC | Age: 51
End: 2021-03-13

## 2021-03-13 NOTE — TELEPHONE ENCOUNTER
----- Message from RUBY Rousseau sent at 3/13/2021  8:08 AM CST -----  Covid negative for sleep study

## 2021-03-15 ENCOUNTER — OFFICE VISIT (OUTPATIENT)
Dept: SLEEP CENTER | Age: 51
End: 2021-03-15
Attending: NURSE PRACTITIONER
Payer: COMMERCIAL

## 2021-03-15 DIAGNOSIS — Z87.820 HISTORY OF TRAUMATIC BRAIN INJURY: ICD-10-CM

## 2021-03-15 DIAGNOSIS — G47.33 OBSTRUCTIVE SLEEP APNEA SYNDROME: ICD-10-CM

## 2021-03-15 DIAGNOSIS — G47.34 NOCTURNAL HYPOXEMIA: ICD-10-CM

## 2021-03-15 PROCEDURE — 95811 POLYSOM 6/>YRS CPAP 4/> PARM: CPT

## 2021-03-22 ENCOUNTER — LAB ENCOUNTER (OUTPATIENT)
Dept: LAB | Age: 51
End: 2021-03-22
Attending: INTERNAL MEDICINE
Payer: COMMERCIAL

## 2021-03-22 DIAGNOSIS — Z01.818 PREOP TESTING: ICD-10-CM

## 2021-03-22 LAB — SARS-COV-2 RNA RESP QL NAA+PROBE: NOT DETECTED

## 2021-03-25 PROBLEM — K57.90 DIVERTICULOSIS: Status: ACTIVE | Noted: 2021-03-25

## 2021-03-29 ENCOUNTER — TELEPHONE (OUTPATIENT)
Dept: FAMILY MEDICINE CLINIC | Facility: CLINIC | Age: 51
End: 2021-03-29

## 2021-03-29 ENCOUNTER — SLEEP STUDY (OUTPATIENT)
Dept: FAMILY MEDICINE CLINIC | Facility: CLINIC | Age: 51
End: 2021-03-29
Payer: COMMERCIAL

## 2021-03-29 DIAGNOSIS — G47.33 OBSTRUCTIVE SLEEP APNEA SYNDROME: Primary | ICD-10-CM

## 2021-03-29 PROCEDURE — 95811 POLYSOM 6/>YRS CPAP 4/> PARM: CPT | Performed by: FAMILY MEDICINE

## 2021-03-29 NOTE — TELEPHONE ENCOUNTER
Spoke with patient. Sleep study is available. Recommended patient undergo bipap titration. He is driving right now and will call back tomorrow with decision.  Updated flowsheet

## 2021-04-07 ENCOUNTER — TELEPHONE (OUTPATIENT)
Dept: SURGERY | Facility: CLINIC | Age: 51
End: 2021-04-07

## 2021-04-07 RX ORDER — TESTOSTERONE CYPIONATE 100 MG/ML
100 INJECTION, SOLUTION INTRAMUSCULAR
Qty: 4 ML | Refills: 5 | Status: SHIPPED | OUTPATIENT
Start: 2021-04-07 | End: 2021-06-07

## 2021-04-07 NOTE — TELEPHONE ENCOUNTER
Per pharmacy needing to clarify quantity for Testosterone Cypionate 100 MG/ML Intramuscular Solution.   Please advise

## 2021-04-08 NOTE — TELEPHONE ENCOUNTER
I called the pharmacy and they stated that they only have 10 mL vials for the testosterone cypionate and asked if the 4 mL can be converted to this. I discussed that they can convert tot he 10mL.  She verbalized understanding and all questions were answered

## 2021-04-23 NOTE — PROGRESS NOTES
HPI:    Daryl Martines is a 52year old male who presents for Follow - Up     Presenting for follow-up.   Patient initially saw me for worsening fatigue was started on testosterone with some improvement also on B12 injections has been off of that for coup total) by mouth daily as needed for Erectile Dysfunction. Take ~ one hour prior to sexual activity on an empty stomach    No current facility-administered medications on file prior to visit.         REVIEW OF SYSTEMS:   Patient denies shortness of breath, d Genitalia:     Rectal:     Extremities: Extremities normal, atraumatic, no cyanosis or edema   Pulses: 2+ and symmetric   Skin: Skin color, texture, turgor normal, no rashes or lesions   Lymph nodes: Cervical, supraclavicular, and axillary nodes normal

## 2021-05-20 ENCOUNTER — IMMUNIZATION (OUTPATIENT)
Dept: LAB | Facility: HOSPITAL | Age: 51
End: 2021-05-20
Attending: HOSPITALIST
Payer: COMMERCIAL

## 2021-05-20 DIAGNOSIS — Z23 NEED FOR VACCINATION: Primary | ICD-10-CM

## 2021-05-20 PROCEDURE — 0001A SARSCOV2 VAC 30MCG/0.3ML IM: CPT

## 2021-05-25 DIAGNOSIS — E53.8 B12 DEFICIENCY: ICD-10-CM

## 2021-05-26 RX ORDER — TESTOSTERONE CYPIONATE 100 MG/ML
100 INJECTION, SOLUTION INTRAMUSCULAR
Qty: 10 ML | Refills: 5 | OUTPATIENT
Start: 2021-05-26 | End: 2021-11-22

## 2021-05-26 RX ORDER — CYANOCOBALAMIN 1000 UG/ML
INJECTION INTRAMUSCULAR; SUBCUTANEOUS
Qty: 6 ML | Refills: 0 | OUTPATIENT
Start: 2021-05-26

## 2021-05-26 NOTE — TELEPHONE ENCOUNTER
Refill request for Testosterone inj. It was refilled on 4/7/2021 with 5 refills. [employer] message was sent to the pt informing him of this and refill will be denied at this time.

## 2021-05-26 NOTE — TELEPHONE ENCOUNTER
Medication(s) to Refill:   Requested Prescriptions     Pending Prescriptions Disp Refills   • cyanocobalamin 1000 MCG/ML Injection Solution 6 mL 0     Si MCG twice monthly for 3 months         Reason for Medication Refill being sent to Provider / Re
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
## 2021-06-07 ENCOUNTER — LAB ENCOUNTER (OUTPATIENT)
Dept: LAB | Facility: HOSPITAL | Age: 51
End: 2021-06-07
Attending: UROLOGY
Payer: COMMERCIAL

## 2021-06-07 ENCOUNTER — OFFICE VISIT (OUTPATIENT)
Dept: SURGERY | Facility: CLINIC | Age: 51
End: 2021-06-07
Payer: COMMERCIAL

## 2021-06-07 VITALS — SYSTOLIC BLOOD PRESSURE: 128 MMHG | DIASTOLIC BLOOD PRESSURE: 81 MMHG

## 2021-06-07 DIAGNOSIS — E29.1 HYPOGONADISM IN MALE: Primary | ICD-10-CM

## 2021-06-07 DIAGNOSIS — R06.83 SNORING: ICD-10-CM

## 2021-06-07 DIAGNOSIS — N52.9 ERECTILE DYSFUNCTION, UNSPECIFIED ERECTILE DYSFUNCTION TYPE: ICD-10-CM

## 2021-06-07 DIAGNOSIS — Z80.42 FAMILY HISTORY OF PROSTATE CANCER IN FATHER: ICD-10-CM

## 2021-06-07 PROCEDURE — 82670 ASSAY OF TOTAL ESTRADIOL: CPT | Performed by: UROLOGY

## 2021-06-07 PROCEDURE — 3079F DIAST BP 80-89 MM HG: CPT | Performed by: UROLOGY

## 2021-06-07 PROCEDURE — 85027 COMPLETE CBC AUTOMATED: CPT | Performed by: UROLOGY

## 2021-06-07 PROCEDURE — 99214 OFFICE O/P EST MOD 30 MIN: CPT | Performed by: UROLOGY

## 2021-06-07 PROCEDURE — 84403 ASSAY OF TOTAL TESTOSTERONE: CPT | Performed by: UROLOGY

## 2021-06-07 PROCEDURE — 84153 ASSAY OF PSA TOTAL: CPT

## 2021-06-07 PROCEDURE — 3074F SYST BP LT 130 MM HG: CPT | Performed by: UROLOGY

## 2021-06-07 PROCEDURE — 36415 COLL VENOUS BLD VENIPUNCTURE: CPT | Performed by: UROLOGY

## 2021-06-07 RX ORDER — TESTOSTERONE CYPIONATE 100 MG/ML
100 INJECTION, SOLUTION INTRAMUSCULAR
Qty: 4 ML | Refills: 5 | Status: SHIPPED | OUTPATIENT
Start: 2021-06-07 | End: 2021-07-06

## 2021-06-07 RX ORDER — SILDENAFIL 100 MG/1
100 TABLET, FILM COATED ORAL
Qty: 30 TABLET | Refills: 5 | Status: SHIPPED | OUTPATIENT
Start: 2021-06-07 | End: 2021-08-13

## 2021-06-08 NOTE — PROGRESS NOTES
Results reviewed. Please let patient know all his labs look OK. Would still recommend he get CPAP machine if this is what was recommended for him.     Thank you,  MPH

## 2021-06-09 ENCOUNTER — TELEPHONE (OUTPATIENT)
Dept: SURGERY | Facility: CLINIC | Age: 51
End: 2021-06-09

## 2021-06-09 NOTE — TELEPHONE ENCOUNTER
----- Message from Raman Gipson MD sent at 6/8/2021  7:46 AM CDT -----  Results reviewed. Please let patient know all his labs look OK. Would still recommend he get CPAP machine if this is what was recommended for him.     Thank you,  MPH

## 2021-06-10 ENCOUNTER — IMMUNIZATION (OUTPATIENT)
Dept: LAB | Facility: HOSPITAL | Age: 51
End: 2021-06-10
Attending: EMERGENCY MEDICINE
Payer: COMMERCIAL

## 2021-06-10 DIAGNOSIS — Z23 NEED FOR VACCINATION: Primary | ICD-10-CM

## 2021-06-10 PROCEDURE — 0002A SARSCOV2 VAC 30MCG/0.3ML IM: CPT

## 2021-07-06 ENCOUNTER — TELEPHONE (OUTPATIENT)
Dept: SURGERY | Facility: CLINIC | Age: 51
End: 2021-07-06

## 2021-07-06 DIAGNOSIS — E53.8 B12 DEFICIENCY: ICD-10-CM

## 2021-07-06 DIAGNOSIS — E29.1 HYPOGONADISM IN MALE: ICD-10-CM

## 2021-07-06 RX ORDER — CYANOCOBALAMIN 1000 UG/ML
INJECTION INTRAMUSCULAR; SUBCUTANEOUS
Qty: 6 ML | Refills: 0 | OUTPATIENT
Start: 2021-07-06

## 2021-07-06 RX ORDER — TESTOSTERONE CYPIONATE 100 MG/ML
100 INJECTION, SOLUTION INTRAMUSCULAR
Qty: 4 ML | Refills: 5 | Status: SHIPPED | OUTPATIENT
Start: 2021-07-06 | End: 2021-07-07

## 2021-07-06 NOTE — TELEPHONE ENCOUNTER
LOV 6/7/21  Testosterone 7/7/.55                      12/12/.66        Testosterone Replacement Medications    Protocol criteria:    • Appointment scheduled in the past 12 months or in the next 2 months  • Normal Total testosterone level withi

## 2021-07-07 RX ORDER — TESTOSTERONE CYPIONATE 100 MG/ML
100 INJECTION, SOLUTION INTRAMUSCULAR
Qty: 10 ML | Refills: 5 | Status: SHIPPED | OUTPATIENT
Start: 2021-07-07 | End: 2021-08-13

## 2021-07-07 NOTE — TELEPHONE ENCOUNTER
Pharmacy calling asking for a verbal for dispense for a 10ml asking can it be sent like that always please advise

## 2021-07-07 NOTE — TELEPHONE ENCOUNTER
MPH the pharmacy called to verify if the order for testosterone can be changed from 4 mL to 10 mL. This medication can only be dispensed in 10 mL. Did you want to change your saved order to reflect this when you order testosterone in the future?

## 2021-07-07 NOTE — TELEPHONE ENCOUNTER
That's fine. You can delete the prior T orders in his med list and keep the one his pharmacy prefers. I will refill the one that is listed in his chart so as long as that's the only order in system it shouldn't be a problem.     thanks

## 2021-07-12 ENCOUNTER — LAB ENCOUNTER (OUTPATIENT)
Dept: LAB | Age: 51
End: 2021-07-12
Attending: INTERNAL MEDICINE
Payer: COMMERCIAL

## 2021-07-12 DIAGNOSIS — Z01.818 PRE-OP TESTING: ICD-10-CM

## 2021-07-13 LAB — SARS-COV-2 RNA RESP QL NAA+PROBE: NOT DETECTED

## 2021-07-15 PROBLEM — K20.0 EOSINOPHILIC ESOPHAGITIS: Status: ACTIVE | Noted: 2021-07-15

## 2021-07-26 DIAGNOSIS — E53.8 B12 DEFICIENCY: ICD-10-CM

## 2021-07-27 ENCOUNTER — PATIENT MESSAGE (OUTPATIENT)
Dept: SURGERY | Facility: CLINIC | Age: 51
End: 2021-07-27

## 2021-07-27 RX ORDER — CYANOCOBALAMIN 1000 UG/ML
INJECTION INTRAMUSCULAR; SUBCUTANEOUS
Qty: 6 ML | Refills: 0 | Status: SHIPPED | OUTPATIENT
Start: 2021-07-27 | End: 2021-10-14

## 2021-07-27 NOTE — TELEPHONE ENCOUNTER
Below is patient's message sent via [employer]:     From: Parvez Mcgowan  To: Akhil Thomson MD  Sent: 7/27/2021  8:32 AM CDT  Subject: Prescription Question    Im out of the testosterone.  I missed a few weeks here and there but when I ordered I was told I s

## 2021-08-13 DIAGNOSIS — N52.9 ERECTILE DYSFUNCTION, UNSPECIFIED ERECTILE DYSFUNCTION TYPE: ICD-10-CM

## 2021-08-13 DIAGNOSIS — E29.1 HYPOGONADISM IN MALE: ICD-10-CM

## 2021-08-16 RX ORDER — TESTOSTERONE CYPIONATE 100 MG/ML
100 INJECTION, SOLUTION INTRAMUSCULAR
Qty: 4 ML | Refills: 5 | Status: SHIPPED | OUTPATIENT
Start: 2021-08-16 | End: 2021-08-28

## 2021-08-16 RX ORDER — SILDENAFIL 100 MG/1
100 TABLET, FILM COATED ORAL
Qty: 30 TABLET | Refills: 5 | Status: SHIPPED | OUTPATIENT
Start: 2021-08-16 | End: 2021-10-14

## 2021-08-16 NOTE — TELEPHONE ENCOUNTER
Erectile dysfunction medications    Protocol Criteria:  • Appointment scheduled in the past 12 months or in the next 2 months  • Patient is not on nitrates (Isosorbide, nitroglycerin)    Recent Outpatient Visits            2 months ago Hypogonadism in male

## 2021-08-16 NOTE — TELEPHONE ENCOUNTER
6/7/21 testosterone -892.55  LOV 6/7/21    Testosterone Replacement Medications    Protocol criteria:    • Appointment scheduled in the past 12 months or in the next 2 months  • Normal Total testosterone level within the last 12 months    Recent Outpatie

## 2021-08-28 DIAGNOSIS — E29.1 HYPOGONADISM IN MALE: ICD-10-CM

## 2021-08-30 RX ORDER — TESTOSTERONE CYPIONATE 100 MG/ML
100 INJECTION, SOLUTION INTRAMUSCULAR
Qty: 4 ML | Refills: 5 | Status: SHIPPED | OUTPATIENT
Start: 2021-08-30 | End: 2021-11-19

## 2021-10-14 DIAGNOSIS — N52.9 ERECTILE DYSFUNCTION, UNSPECIFIED ERECTILE DYSFUNCTION TYPE: ICD-10-CM

## 2021-10-14 DIAGNOSIS — E53.8 B12 DEFICIENCY: ICD-10-CM

## 2021-10-14 RX ORDER — CYANOCOBALAMIN 1000 UG/ML
INJECTION INTRAMUSCULAR; SUBCUTANEOUS
Qty: 6 ML | Refills: 0 | Status: SHIPPED | OUTPATIENT
Start: 2021-10-14 | End: 2022-01-03

## 2021-10-15 RX ORDER — SILDENAFIL 100 MG/1
100 TABLET, FILM COATED ORAL
Qty: 30 TABLET | Refills: 5 | Status: SHIPPED | OUTPATIENT
Start: 2021-10-15 | End: 2021-12-06

## 2021-10-15 NOTE — TELEPHONE ENCOUNTER
Apt 4 months ago. Not on nitrates.  RX refilled per protocol    Erectile dysfunction medications    Protocol Criteria:  • Appointment scheduled in the past 12 months or in the next 2 months  • Patient is not on nitrates (Isosorbide, nitroglycerin)    Recent

## 2021-11-19 DIAGNOSIS — E29.1 HYPOGONADISM IN MALE: ICD-10-CM

## 2021-11-22 RX ORDER — TESTOSTERONE CYPIONATE 100 MG/ML
100 INJECTION, SOLUTION INTRAMUSCULAR
Qty: 4 ML | Refills: 5 | Status: SHIPPED | OUTPATIENT
Start: 2021-11-22 | End: 2021-12-06

## 2021-11-22 NOTE — TELEPHONE ENCOUNTER
LOV 5 months ago RTC in 2 weeks Testosterone 6/7/21 elevated at 892.55  Dr. Alem Candelaria is aware of results. Desiree please decide on refill.  Thank you      Testosterone Replacement Medications    Protocol criteria:    • Appointment scheduled in the past 12 months
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
## 2021-12-06 ENCOUNTER — OFFICE VISIT (OUTPATIENT)
Dept: SURGERY | Facility: CLINIC | Age: 51
End: 2021-12-06
Payer: COMMERCIAL

## 2021-12-06 ENCOUNTER — LAB ENCOUNTER (OUTPATIENT)
Dept: LAB | Facility: HOSPITAL | Age: 51
End: 2021-12-06
Attending: UROLOGY
Payer: COMMERCIAL

## 2021-12-06 DIAGNOSIS — N52.9 ERECTILE DYSFUNCTION, UNSPECIFIED ERECTILE DYSFUNCTION TYPE: ICD-10-CM

## 2021-12-06 DIAGNOSIS — Z80.42 FAMILY HISTORY OF PROSTATE CANCER IN FATHER: ICD-10-CM

## 2021-12-06 DIAGNOSIS — E29.1 HYPOGONADISM IN MALE: Primary | ICD-10-CM

## 2021-12-06 PROCEDURE — 99214 OFFICE O/P EST MOD 30 MIN: CPT | Performed by: UROLOGY

## 2021-12-06 PROCEDURE — 36415 COLL VENOUS BLD VENIPUNCTURE: CPT | Performed by: UROLOGY

## 2021-12-06 PROCEDURE — 85027 COMPLETE CBC AUTOMATED: CPT | Performed by: UROLOGY

## 2021-12-06 PROCEDURE — 81003 URINALYSIS AUTO W/O SCOPE: CPT | Performed by: UROLOGY

## 2021-12-06 PROCEDURE — 82670 ASSAY OF TOTAL ESTRADIOL: CPT | Performed by: UROLOGY

## 2021-12-06 PROCEDURE — 84403 ASSAY OF TOTAL TESTOSTERONE: CPT | Performed by: UROLOGY

## 2021-12-06 RX ORDER — SILDENAFIL 100 MG/1
100 TABLET, FILM COATED ORAL
Qty: 30 TABLET | Refills: 5 | Status: SHIPPED | OUTPATIENT
Start: 2021-12-06

## 2021-12-06 RX ORDER — TESTOSTERONE CYPIONATE 100 MG/ML
100 INJECTION, SOLUTION INTRAMUSCULAR
Qty: 4 ML | Refills: 5 | Status: SHIPPED | OUTPATIENT
Start: 2021-12-06 | End: 2022-01-03

## 2021-12-07 NOTE — PROGRESS NOTES
Sajan Huntley,  I have reviewed your testosterone labs. This shows no significant abnormalities which is great news. No changes to the plan as we had previously discussed. Please let me know if you have any questions.   Thanks and take care,    Nicol Barboza MD

## 2022-01-03 DIAGNOSIS — E29.1 HYPOGONADISM IN MALE: ICD-10-CM

## 2022-01-03 DIAGNOSIS — E53.8 B12 DEFICIENCY: ICD-10-CM

## 2022-01-04 RX ORDER — CYANOCOBALAMIN 1000 UG/ML
INJECTION INTRAMUSCULAR; SUBCUTANEOUS
Qty: 6 ML | Refills: 0 | Status: SHIPPED | OUTPATIENT
Start: 2022-01-04

## 2022-01-07 RX ORDER — TESTOSTERONE CYPIONATE 100 MG/ML
100 INJECTION, SOLUTION INTRAMUSCULAR
Qty: 4 ML | Refills: 5 | Status: SHIPPED | OUTPATIENT
Start: 2022-01-07 | End: 2022-07-06

## 2022-01-07 NOTE — TELEPHONE ENCOUNTER
LOV one month ago, Testosterone 743.45 on 12/6/21.  Forwarded request to Dr. Karthikeyan Childs for approval    Testosterone Replacement Medications    Protocol criteria:    • Appointment scheduled in the past 12 months or in the next 2 months  • Normal Total testosteron

## 2022-02-03 ENCOUNTER — PATIENT MESSAGE (OUTPATIENT)
Dept: FAMILY MEDICINE CLINIC | Facility: CLINIC | Age: 52
End: 2022-02-03

## 2022-02-03 DIAGNOSIS — E29.1 HYPOGONADISM IN MALE: ICD-10-CM

## 2022-02-03 NOTE — TELEPHONE ENCOUNTER
From: Elizabeth Davis  To: Jerel Marcos MD  Sent: 2/3/2022 1:47 PM CST  Subject: B12 Syringe    Good afternoon Dr. Gayle Vegas you are doing well. I accidently deleted the reordering line of the B12 syringe. Can you please reenter it so I can order. Thank You.

## 2022-02-04 RX ORDER — TESTOSTERONE CYPIONATE 100 MG/ML
100 INJECTION, SOLUTION INTRAMUSCULAR
Qty: 4 ML | Refills: 5 | OUTPATIENT
Start: 2022-02-04 | End: 2022-08-03

## 2022-02-12 DIAGNOSIS — N52.9 ERECTILE DYSFUNCTION, UNSPECIFIED ERECTILE DYSFUNCTION TYPE: ICD-10-CM

## 2022-02-14 RX ORDER — SILDENAFIL 100 MG/1
100 TABLET, FILM COATED ORAL
Qty: 30 TABLET | Refills: 5 | OUTPATIENT
Start: 2022-02-14

## 2022-03-02 ENCOUNTER — PATIENT MESSAGE (OUTPATIENT)
Dept: FAMILY MEDICINE CLINIC | Facility: CLINIC | Age: 52
End: 2022-03-02

## 2022-03-02 NOTE — TELEPHONE ENCOUNTER
From: Kimberly Echavarriaing  To: Shea Bergeron MD  Sent: 2/3/2022 1:47 PM CST  Subject: B12 Syringe    Good afternoon Dr. Hartman Pluck you are doing well. I accidently deleted the reordering line of the B12 syringe. Can you please reenter it so I can order. Thank You.

## 2022-03-09 ENCOUNTER — OFFICE VISIT (OUTPATIENT)
Dept: FAMILY MEDICINE CLINIC | Facility: CLINIC | Age: 52
End: 2022-03-09
Payer: COMMERCIAL

## 2022-03-09 VITALS
OXYGEN SATURATION: 99 % | SYSTOLIC BLOOD PRESSURE: 120 MMHG | HEART RATE: 94 BPM | DIASTOLIC BLOOD PRESSURE: 80 MMHG | RESPIRATION RATE: 16 BRPM | WEIGHT: 190 LBS | BODY MASS INDEX: 30.53 KG/M2 | HEIGHT: 66 IN

## 2022-03-09 DIAGNOSIS — M75.81 RIGHT ROTATOR CUFF TENDONITIS: Primary | ICD-10-CM

## 2022-03-09 DIAGNOSIS — M62.838 TRAPEZIUS MUSCLE SPASM: ICD-10-CM

## 2022-03-09 DIAGNOSIS — F43.10 PTSD (POST-TRAUMATIC STRESS DISORDER): ICD-10-CM

## 2022-03-09 PROCEDURE — 3074F SYST BP LT 130 MM HG: CPT | Performed by: FAMILY MEDICINE

## 2022-03-09 PROCEDURE — 3079F DIAST BP 80-89 MM HG: CPT | Performed by: FAMILY MEDICINE

## 2022-03-09 PROCEDURE — 99214 OFFICE O/P EST MOD 30 MIN: CPT | Performed by: FAMILY MEDICINE

## 2022-03-09 PROCEDURE — 3008F BODY MASS INDEX DOCD: CPT | Performed by: FAMILY MEDICINE

## 2022-03-09 RX ORDER — CYCLOBENZAPRINE HCL 10 MG
10 TABLET ORAL NIGHTLY PRN
Qty: 30 TABLET | Refills: 1 | Status: SHIPPED | OUTPATIENT
Start: 2022-03-09 | End: 2022-03-29

## 2022-03-09 RX ORDER — METHYLPREDNISOLONE 4 MG/1
TABLET ORAL
Qty: 1 EACH | Refills: 0 | Status: SHIPPED | OUTPATIENT
Start: 2022-03-09 | End: 2022-03-23

## 2022-03-10 ENCOUNTER — PATIENT MESSAGE (OUTPATIENT)
Dept: FAMILY MEDICINE CLINIC | Facility: CLINIC | Age: 52
End: 2022-03-10

## 2022-03-10 NOTE — TELEPHONE ENCOUNTER
From: Elizabeth Davis  To: Jerel Marcos MD  Sent: 3/10/2022 8:01 AM CST  Subject: AFTER VISIT SUMMARY    Good morning,    I didn't find a \"light duty\" statement on your report. Can you adjust it. Also, this morning I woke up in pain again. I must have rolled on my arm. Can you give me something for the pain. Over counter doesn't help. I'm miserable at work today and I need to be here for my team.  Thank You.

## 2022-03-10 NOTE — TELEPHONE ENCOUNTER
Had OV yesterday. Requesting pain medication as he says OTC is not working. He also is asking you to rewrite your discharge paperwork to include light duty for his work. Please advise

## 2022-03-12 PROBLEM — F43.10 PTSD (POST-TRAUMATIC STRESS DISORDER): Status: ACTIVE | Noted: 2022-03-12

## 2022-03-14 RX ORDER — DICLOFENAC SODIUM 75 MG/1
75 TABLET, DELAYED RELEASE ORAL 2 TIMES DAILY PRN
Qty: 30 TABLET | Refills: 1 | Status: SHIPPED | OUTPATIENT
Start: 2022-03-14

## 2022-03-14 RX ORDER — ACETAMINOPHEN AND CODEINE PHOSPHATE 300; 30 MG/1; MG/1
1 TABLET ORAL NIGHTLY PRN
Qty: 20 TABLET | Refills: 0 | Status: SHIPPED | OUTPATIENT
Start: 2022-03-14

## 2022-03-14 NOTE — TELEPHONE ENCOUNTER
Regarding: AFTER VISIT SUMMARY  ----- Message from Seema Reaves MD sent at 3/14/2022  2:25 PM CDT -----       ----- Message from Uyen Storey to Parmjit Hood MD sent at 3/10/2022  8:01 AM -----   Good morning,    I didn't find a \"light duty\" statement on your report. Can you adjust it. Also, this morning I woke up in pain again. I must have rolled on my arm. Can you give me something for the pain. Over counter doesn't help. I'm miserable at work today and I need to be here for my team.  Thank You.

## 2022-03-14 NOTE — TELEPHONE ENCOUNTER
[id number] [address] Montoya for light duty for 2 weeks. I also put in referral for PT  nsaids and T#3 prn for acute pain was sent over.

## 2022-03-23 RX ORDER — METHYLPREDNISOLONE 4 MG/1
TABLET ORAL
Qty: 1 EACH | Refills: 0 | Status: SHIPPED | OUTPATIENT
Start: 2022-03-23

## 2022-03-23 RX ORDER — CYANOCOBALAMIN 1000 UG/ML
INJECTION INTRAMUSCULAR; SUBCUTANEOUS
Qty: 6 ML | Refills: 0 | Status: SHIPPED | OUTPATIENT
Start: 2022-03-23

## 2022-03-30 RX ORDER — TESTOSTERONE CYPIONATE 100 MG/ML
100 INJECTION, SOLUTION INTRAMUSCULAR
Qty: 4 ML | Refills: 5 | Status: SHIPPED | OUTPATIENT
Start: 2022-03-30 | End: 2022-09-26

## 2022-03-30 RX ORDER — SILDENAFIL 100 MG/1
100 TABLET, FILM COATED ORAL
Qty: 30 TABLET | Refills: 5 | Status: SHIPPED | OUTPATIENT
Start: 2022-03-30

## 2022-04-28 RX ORDER — TESTOSTERONE CYPIONATE 100 MG/ML
100 INJECTION, SOLUTION INTRAMUSCULAR
Qty: 4 ML | Refills: 5 | OUTPATIENT
Start: 2022-04-28 | End: 2022-10-25

## 2022-04-28 NOTE — TELEPHONE ENCOUNTER
Refill request for testosterone. I called the pharmacy and they confirmed the pt has refills available on the prescription that was sent in on 3/30/2022. Refill request will be denied at this time and [facility] sent tot he pt.

## 2022-06-08 DIAGNOSIS — E29.1 HYPOGONADISM IN MALE: ICD-10-CM

## 2022-06-09 RX ORDER — TESTOSTERONE CYPIONATE 100 MG/ML
100 INJECTION, SOLUTION INTRAMUSCULAR
Qty: 4 ML | Refills: 5 | Status: SHIPPED | OUTPATIENT
Start: 2022-06-09 | End: 2022-06-10

## 2022-06-09 NOTE — TELEPHONE ENCOUNTER
Pt needs labs checked. Preferable before his visit if timed okay with injections.      Future Appointments   Date Time Provider Yoselyn Dubois   6/15/2022  8:00 AM Sammy Wen MD [facility] [address]

## 2022-06-10 ENCOUNTER — TELEPHONE (OUTPATIENT)
Dept: SURGERY | Facility: CLINIC | Age: 52
End: 2022-06-10

## 2022-06-10 DIAGNOSIS — E29.1 HYPOGONADISM IN MALE: ICD-10-CM

## 2022-06-10 RX ORDER — TESTOSTERONE CYPIONATE 100 MG/ML
100 INJECTION, SOLUTION INTRAMUSCULAR
Qty: 10 ML | Refills: 2 | Status: SHIPPED | OUTPATIENT
Start: 2022-06-10 | End: 2022-12-07

## 2022-06-10 NOTE — TELEPHONE ENCOUNTER
Pharmacy calling about testosterone rx - this med only comes in 10 ml vial - asking if they can dispense that instead of 4 ml written on rx

## 2022-06-15 ENCOUNTER — TELEPHONE (OUTPATIENT)
Dept: SURGERY | Facility: CLINIC | Age: 52
End: 2022-06-15

## 2022-06-15 ENCOUNTER — TELEMEDICINE (OUTPATIENT)
Dept: SURGERY | Facility: CLINIC | Age: 52
End: 2022-06-15

## 2022-06-15 DIAGNOSIS — E29.1 HYPOGONADISM IN MALE: Primary | ICD-10-CM

## 2022-06-15 DIAGNOSIS — N52.9 ERECTILE DYSFUNCTION, UNSPECIFIED ERECTILE DYSFUNCTION TYPE: ICD-10-CM

## 2022-06-15 DIAGNOSIS — Z80.42 FAMILY HISTORY OF PROSTATE CANCER IN FATHER: ICD-10-CM

## 2022-06-15 DIAGNOSIS — R06.83 SNORING: ICD-10-CM

## 2022-06-15 PROCEDURE — 99214 OFFICE O/P EST MOD 30 MIN: CPT | Performed by: UROLOGY

## 2022-06-15 RX ORDER — TESTOSTERONE CYPIONATE 100 MG/ML
100 INJECTION, SOLUTION INTRAMUSCULAR
Qty: 10 ML | Refills: 2 | Status: SHIPPED | OUTPATIENT
Start: 2022-06-15 | End: 2022-12-12

## 2022-06-15 NOTE — TELEPHONE ENCOUNTER
Please call this patient or their family and schedule the patient for a follow up with me in 6 mo with T labs prior.     Thanks,    MPH

## 2022-06-27 DIAGNOSIS — E53.8 B12 DEFICIENCY: ICD-10-CM

## 2022-06-27 DIAGNOSIS — N52.9 ERECTILE DYSFUNCTION, UNSPECIFIED ERECTILE DYSFUNCTION TYPE: ICD-10-CM

## 2022-06-28 RX ORDER — CYANOCOBALAMIN 1000 UG/ML
INJECTION INTRAMUSCULAR; SUBCUTANEOUS
Qty: 6 ML | Refills: 0 | Status: SHIPPED | OUTPATIENT
Start: 2022-06-28

## 2022-06-30 RX ORDER — SILDENAFIL 100 MG/1
100 TABLET, FILM COATED ORAL
Qty: 30 TABLET | Refills: 5 | OUTPATIENT
Start: 2022-06-30

## 2022-06-30 NOTE — TELEPHONE ENCOUNTER
Refill request for Sildenafil. Last refilled on 3/30/2022 30 tabs with 5 refills. Refill request is too soon and will be denied at this time.  [facility] sent

## 2022-07-16 DIAGNOSIS — F41.1 GAD (GENERALIZED ANXIETY DISORDER): ICD-10-CM

## 2022-07-18 RX ORDER — ESCITALOPRAM OXALATE 10 MG/1
10 TABLET ORAL NIGHTLY
Qty: 30 TABLET | Refills: 3 | Status: SHIPPED | OUTPATIENT
Start: 2022-07-18

## 2022-08-30 DIAGNOSIS — N52.9 ERECTILE DYSFUNCTION, UNSPECIFIED ERECTILE DYSFUNCTION TYPE: ICD-10-CM

## 2022-08-30 DIAGNOSIS — E29.1 HYPOGONADISM IN MALE: ICD-10-CM

## 2022-08-31 DIAGNOSIS — N52.9 ERECTILE DYSFUNCTION, UNSPECIFIED ERECTILE DYSFUNCTION TYPE: ICD-10-CM

## 2022-08-31 RX ORDER — TESTOSTERONE CYPIONATE 100 MG/ML
100 INJECTION, SOLUTION INTRAMUSCULAR
Qty: 10 ML | Refills: 2 | Status: SHIPPED | OUTPATIENT
Start: 2022-08-31 | End: 2023-02-27

## 2022-08-31 RX ORDER — SILDENAFIL 100 MG/1
100 TABLET, FILM COATED ORAL
Qty: 30 TABLET | Refills: 5 | Status: SHIPPED | OUTPATIENT
Start: 2022-08-31

## 2022-09-01 RX ORDER — SILDENAFIL 100 MG/1
100 TABLET, FILM COATED ORAL
Qty: 30 TABLET | Refills: 5 | OUTPATIENT
Start: 2022-09-01

## 2022-09-14 DIAGNOSIS — E53.8 B12 DEFICIENCY: ICD-10-CM

## 2022-09-14 RX ORDER — CYANOCOBALAMIN 1000 UG/ML
INJECTION INTRAMUSCULAR; SUBCUTANEOUS
Qty: 6 ML | Refills: 0 | Status: SHIPPED | OUTPATIENT
Start: 2022-09-14

## 2022-10-19 ENCOUNTER — TELEPHONE (OUTPATIENT)
Dept: FAMILY MEDICINE CLINIC | Facility: CLINIC | Age: 52
End: 2022-10-19

## 2022-10-19 NOTE — TELEPHONE ENCOUNTER
Rec RONIT from [employer] for workers comp, sent to scan stat     Date of claim 3/2/22

## 2022-11-09 DIAGNOSIS — E29.1 HYPOGONADISM IN MALE: ICD-10-CM

## 2022-11-09 DIAGNOSIS — N52.9 ERECTILE DYSFUNCTION, UNSPECIFIED ERECTILE DYSFUNCTION TYPE: ICD-10-CM

## 2022-11-09 RX ORDER — TESTOSTERONE CYPIONATE 100 MG/ML
100 INJECTION, SOLUTION INTRAMUSCULAR
Qty: 10 ML | Refills: 2 | Status: SHIPPED | OUTPATIENT
Start: 2022-11-09 | End: 2023-05-08

## 2022-11-09 RX ORDER — SILDENAFIL 100 MG/1
100 TABLET, FILM COATED ORAL
Qty: 30 TABLET | Refills: 5 | Status: SHIPPED | OUTPATIENT
Start: 2022-11-09

## 2022-11-15 ENCOUNTER — TELEPHONE (OUTPATIENT)
Dept: SURGERY | Facility: CLINIC | Age: 52
End: 2022-11-15

## 2022-11-15 RX ORDER — TESTOSTERONE CYPIONATE 200 MG/ML
100 INJECTION INTRAMUSCULAR
Qty: 5 ML | Refills: 2 | Status: SHIPPED | OUTPATIENT
Start: 2022-11-15

## 2022-11-30 DIAGNOSIS — E53.8 B12 DEFICIENCY: ICD-10-CM

## 2022-12-01 RX ORDER — CYANOCOBALAMIN 1000 UG/ML
INJECTION, SOLUTION INTRAMUSCULAR; SUBCUTANEOUS
Qty: 6 ML | Refills: 0 | Status: SHIPPED | OUTPATIENT
Start: 2022-12-01

## 2022-12-18 ENCOUNTER — OFFICE VISIT (OUTPATIENT)
Dept: FAMILY MEDICINE CLINIC | Facility: CLINIC | Age: 52
End: 2022-12-18
Payer: COMMERCIAL

## 2022-12-18 VITALS
OXYGEN SATURATION: 98 % | HEART RATE: 89 BPM | HEIGHT: 66 IN | WEIGHT: 191 LBS | SYSTOLIC BLOOD PRESSURE: 140 MMHG | BODY MASS INDEX: 30.7 KG/M2 | RESPIRATION RATE: 18 BRPM | DIASTOLIC BLOOD PRESSURE: 90 MMHG | TEMPERATURE: 97 F

## 2022-12-18 DIAGNOSIS — R68.89 FLU-LIKE SYMPTOMS: Primary | ICD-10-CM

## 2022-12-18 PROCEDURE — 3080F DIAST BP >= 90 MM HG: CPT | Performed by: NURSE PRACTITIONER

## 2022-12-18 PROCEDURE — 99213 OFFICE O/P EST LOW 20 MIN: CPT | Performed by: NURSE PRACTITIONER

## 2022-12-18 PROCEDURE — 3008F BODY MASS INDEX DOCD: CPT | Performed by: NURSE PRACTITIONER

## 2022-12-18 PROCEDURE — 87637 SARSCOV2&INF A&B&RSV AMP PRB: CPT | Performed by: NURSE PRACTITIONER

## 2022-12-18 PROCEDURE — 3077F SYST BP >= 140 MM HG: CPT | Performed by: NURSE PRACTITIONER

## 2022-12-19 LAB
FLUAV + FLUBV RNA SPEC NAA+PROBE: DETECTED
FLUAV + FLUBV RNA SPEC NAA+PROBE: NOT DETECTED
RSV RNA SPEC NAA+PROBE: NOT DETECTED
SARS-COV-2 RNA RESP QL NAA+PROBE: NOT DETECTED

## 2023-01-09 ENCOUNTER — OFFICE VISIT (OUTPATIENT)
Dept: SURGERY | Facility: CLINIC | Age: 53
End: 2023-01-09
Payer: COMMERCIAL

## 2023-01-09 ENCOUNTER — LAB ENCOUNTER (OUTPATIENT)
Dept: LAB | Facility: HOSPITAL | Age: 53
End: 2023-01-09
Attending: UROLOGY
Payer: COMMERCIAL

## 2023-01-09 DIAGNOSIS — R06.83 SNORING: ICD-10-CM

## 2023-01-09 DIAGNOSIS — E29.1 HYPOGONADISM IN MALE: ICD-10-CM

## 2023-01-09 DIAGNOSIS — N13.8 BPH WITH OBSTRUCTION/LOWER URINARY TRACT SYMPTOMS: ICD-10-CM

## 2023-01-09 DIAGNOSIS — N40.1 BPH WITH OBSTRUCTION/LOWER URINARY TRACT SYMPTOMS: ICD-10-CM

## 2023-01-09 DIAGNOSIS — N52.9 ERECTILE DYSFUNCTION, UNSPECIFIED ERECTILE DYSFUNCTION TYPE: Primary | ICD-10-CM

## 2023-01-09 DIAGNOSIS — Z80.42 FAMILY HISTORY OF PROSTATE CANCER IN FATHER: ICD-10-CM

## 2023-01-09 LAB
APPEARANCE: CLEAR
BILIRUBIN: NEGATIVE
ERYTHROCYTE [DISTWIDTH] IN BLOOD BY AUTOMATED COUNT: 12.8 %
ESTRADIOL SERPL-MCNC: 78.2 PG/ML
GLUCOSE (URINE DIPSTICK): NEGATIVE MG/DL
HCT VFR BLD AUTO: 49.9 %
HGB BLD-MCNC: 17.5 G/DL
KETONES (URINE DIPSTICK): NEGATIVE MG/DL
LEUKOCYTES: NEGATIVE
MCH RBC QN AUTO: 32.5 PG (ref 26–34)
MCHC RBC AUTO-ENTMCNC: 35.1 G/DL (ref 31–37)
MCV RBC AUTO: 92.8 FL
MULTISTIX LOT#: ABNORMAL NUMERIC
NITRITE, URINE: NEGATIVE
PH, URINE: 7 (ref 4.5–8)
PLATELET # BLD AUTO: 406 10(3)UL (ref 150–450)
PROTEIN (URINE DIPSTICK): NEGATIVE MG/DL
PSA SERPL-MCNC: 2.93 NG/ML (ref ?–4)
RBC # BLD AUTO: 5.38 X10(6)UL
SPECIFIC GRAVITY: 1.02 (ref 1–1.03)
TESTOST SERPL-MCNC: 909.33 NG/DL
URINE-COLOR: YELLOW
UROBILINOGEN,SEMI-QN: 0.2 MG/DL (ref 0–1.9)
WBC # BLD AUTO: 8.4 X10(3) UL (ref 4–11)

## 2023-01-09 PROCEDURE — 82670 ASSAY OF TOTAL ESTRADIOL: CPT

## 2023-01-09 PROCEDURE — 85027 COMPLETE CBC AUTOMATED: CPT

## 2023-01-09 PROCEDURE — 84153 ASSAY OF PSA TOTAL: CPT

## 2023-01-09 PROCEDURE — 36415 COLL VENOUS BLD VENIPUNCTURE: CPT

## 2023-01-09 PROCEDURE — 84403 ASSAY OF TOTAL TESTOSTERONE: CPT

## 2023-01-09 PROCEDURE — 99214 OFFICE O/P EST MOD 30 MIN: CPT | Performed by: UROLOGY

## 2023-01-09 PROCEDURE — 81003 URINALYSIS AUTO W/O SCOPE: CPT | Performed by: UROLOGY

## 2023-01-09 RX ORDER — TESTOSTERONE CYPIONATE 200 MG/ML
100 INJECTION INTRAMUSCULAR
Qty: 5 ML | Refills: 2 | Status: SHIPPED | OUTPATIENT
Start: 2023-01-09

## 2023-01-09 RX ORDER — SILDENAFIL 100 MG/1
100 TABLET, FILM COATED ORAL
Qty: 30 TABLET | Refills: 11 | Status: SHIPPED | OUTPATIENT
Start: 2023-01-09

## 2023-01-09 RX ORDER — SILDENAFIL 100 MG/1
100 TABLET, FILM COATED ORAL
Qty: 30 TABLET | Refills: 0 | Status: SHIPPED | OUTPATIENT
Start: 2023-01-09 | End: 2023-01-09

## 2023-01-24 DIAGNOSIS — E29.1 HYPOGONADISM IN MALE: ICD-10-CM

## 2023-02-17 DIAGNOSIS — E29.1 HYPOGONADISM IN MALE: ICD-10-CM

## 2023-02-17 DIAGNOSIS — E53.8 B12 DEFICIENCY: ICD-10-CM

## 2023-02-17 RX ORDER — CYANOCOBALAMIN 1000 UG/ML
INJECTION, SOLUTION INTRAMUSCULAR; SUBCUTANEOUS
Qty: 6 ML | Refills: 0 | Status: SHIPPED | OUTPATIENT
Start: 2023-02-17

## 2023-03-01 DIAGNOSIS — E29.1 HYPOGONADISM IN MALE: ICD-10-CM

## 2023-03-01 DIAGNOSIS — N52.9 ERECTILE DYSFUNCTION, UNSPECIFIED ERECTILE DYSFUNCTION TYPE: ICD-10-CM

## 2023-03-01 RX ORDER — SILDENAFIL 100 MG/1
100 TABLET, FILM COATED ORAL
Qty: 30 TABLET | Refills: 11 | Status: CANCELLED | OUTPATIENT
Start: 2023-03-01

## 2023-03-08 RX ORDER — TESTOSTERONE CYPIONATE 200 MG/ML
100 INJECTION, SOLUTION INTRAMUSCULAR
Qty: 10 ML | Refills: 1 | Status: SHIPPED | OUTPATIENT
Start: 2023-03-08

## 2023-03-27 DIAGNOSIS — E29.1 HYPOGONADISM IN MALE: ICD-10-CM

## 2023-04-27 DIAGNOSIS — N52.9 ERECTILE DYSFUNCTION, UNSPECIFIED ERECTILE DYSFUNCTION TYPE: ICD-10-CM

## 2023-04-27 DIAGNOSIS — E29.1 HYPOGONADISM IN MALE: ICD-10-CM

## 2023-04-27 RX ORDER — TESTOSTERONE CYPIONATE 200 MG/ML
100 INJECTION, SOLUTION INTRAMUSCULAR
Qty: 10 ML | Refills: 1 | Status: SHIPPED | OUTPATIENT
Start: 2023-04-27

## 2023-04-28 RX ORDER — SILDENAFIL 100 MG/1
100 TABLET, FILM COATED ORAL
Qty: 30 TABLET | Refills: 11 | Status: SHIPPED | OUTPATIENT
Start: 2023-04-28

## 2023-05-10 DIAGNOSIS — E29.1 HYPOGONADISM IN MALE: ICD-10-CM

## 2023-05-10 DIAGNOSIS — E53.8 B12 DEFICIENCY: ICD-10-CM

## 2023-05-11 ENCOUNTER — PATIENT MESSAGE (OUTPATIENT)
Dept: FAMILY MEDICINE CLINIC | Facility: CLINIC | Age: 53
End: 2023-05-11

## 2023-05-12 RX ORDER — CYANOCOBALAMIN 1000 UG/ML
INJECTION, SOLUTION INTRAMUSCULAR; SUBCUTANEOUS
Qty: 6 ML | Refills: 0 | Status: SHIPPED | OUTPATIENT
Start: 2023-05-12

## 2023-05-23 ENCOUNTER — OFFICE VISIT (OUTPATIENT)
Dept: FAMILY MEDICINE CLINIC | Facility: CLINIC | Age: 53
End: 2023-05-23
Payer: COMMERCIAL

## 2023-05-23 VITALS
BODY MASS INDEX: 31.02 KG/M2 | SYSTOLIC BLOOD PRESSURE: 120 MMHG | HEART RATE: 82 BPM | RESPIRATION RATE: 16 BRPM | HEIGHT: 66 IN | OXYGEN SATURATION: 97 % | WEIGHT: 193 LBS | DIASTOLIC BLOOD PRESSURE: 80 MMHG

## 2023-05-23 DIAGNOSIS — K64.4 EXTERNAL HEMORRHOID: ICD-10-CM

## 2023-05-23 DIAGNOSIS — Z13.6 SCREENING FOR HEART DISEASE: ICD-10-CM

## 2023-05-23 DIAGNOSIS — E29.1 HYPOGONADISM IN MALE: ICD-10-CM

## 2023-05-23 DIAGNOSIS — Z00.00 ROUTINE GENERAL MEDICAL EXAMINATION AT HEALTH CARE FACILITY: Primary | ICD-10-CM

## 2023-05-23 DIAGNOSIS — Z23 NEED FOR TDAP VACCINATION: ICD-10-CM

## 2023-05-23 DIAGNOSIS — E78.2 MIXED HYPERLIPIDEMIA: ICD-10-CM

## 2023-05-23 PROBLEM — R06.83 SNORING: Status: RESOLVED | Noted: 2021-06-07 | Resolved: 2023-05-23

## 2023-06-09 ENCOUNTER — LAB ENCOUNTER (OUTPATIENT)
Dept: LAB | Age: 53
End: 2023-06-09
Attending: FAMILY MEDICINE
Payer: COMMERCIAL

## 2023-06-09 DIAGNOSIS — Z00.00 ROUTINE GENERAL MEDICAL EXAMINATION AT HEALTH CARE FACILITY: ICD-10-CM

## 2023-06-09 LAB
ALBUMIN SERPL-MCNC: 4.3 G/DL (ref 3.4–5)
ALBUMIN/GLOB SERPL: 1.3 {RATIO} (ref 1–2)
ALP LIVER SERPL-CCNC: 72 U/L
ALT SERPL-CCNC: 41 U/L
ANION GAP SERPL CALC-SCNC: 6 MMOL/L (ref 0–18)
AST SERPL-CCNC: 18 U/L (ref 15–37)
BASOPHILS # BLD AUTO: 0.07 X10(3) UL (ref 0–0.2)
BASOPHILS NFR BLD AUTO: 1.2 %
BILIRUB SERPL-MCNC: 0.6 MG/DL (ref 0.1–2)
BUN BLD-MCNC: 20 MG/DL (ref 7–18)
CALCIUM BLD-MCNC: 9.2 MG/DL (ref 8.5–10.1)
CHLORIDE SERPL-SCNC: 105 MMOL/L (ref 98–112)
CHOLEST SERPL-MCNC: 205 MG/DL (ref ?–200)
CO2 SERPL-SCNC: 26 MMOL/L (ref 21–32)
COMPLEXED PSA SERPL-MCNC: 2.88 NG/ML (ref ?–4)
CREAT BLD-MCNC: 1.17 MG/DL
EOSINOPHIL # BLD AUTO: 0.14 X10(3) UL (ref 0–0.7)
EOSINOPHIL NFR BLD AUTO: 2.3 %
ERYTHROCYTE [DISTWIDTH] IN BLOOD BY AUTOMATED COUNT: 12.6 %
FASTING PATIENT LIPID ANSWER: YES
FASTING STATUS PATIENT QL REPORTED: YES
GFR SERPLBLD BASED ON 1.73 SQ M-ARVRAT: 76 ML/MIN/1.73M2 (ref 60–?)
GLOBULIN PLAS-MCNC: 3.2 G/DL (ref 2.8–4.4)
GLUCOSE BLD-MCNC: 103 MG/DL (ref 70–99)
HCT VFR BLD AUTO: 54.9 %
HDLC SERPL-MCNC: 36 MG/DL (ref 40–59)
HGB BLD-MCNC: 18 G/DL
IMM GRANULOCYTES # BLD AUTO: 0.02 X10(3) UL (ref 0–1)
IMM GRANULOCYTES NFR BLD: 0.3 %
LDLC SERPL CALC-MCNC: 153 MG/DL (ref ?–100)
LYMPHOCYTES # BLD AUTO: 2.52 X10(3) UL (ref 1–4)
LYMPHOCYTES NFR BLD AUTO: 41.9 %
MCH RBC QN AUTO: 31.4 PG (ref 26–34)
MCHC RBC AUTO-ENTMCNC: 32.8 G/DL (ref 31–37)
MCV RBC AUTO: 95.8 FL
MONOCYTES # BLD AUTO: 0.61 X10(3) UL (ref 0.1–1)
MONOCYTES NFR BLD AUTO: 10.1 %
NEUTROPHILS # BLD AUTO: 2.66 X10 (3) UL (ref 1.5–7.7)
NEUTROPHILS # BLD AUTO: 2.66 X10(3) UL (ref 1.5–7.7)
NEUTROPHILS NFR BLD AUTO: 44.2 %
NONHDLC SERPL-MCNC: 169 MG/DL (ref ?–130)
OSMOLALITY SERPL CALC.SUM OF ELEC: 287 MOSM/KG (ref 275–295)
PLATELET # BLD AUTO: 349 10(3)UL (ref 150–450)
POTASSIUM SERPL-SCNC: 3.8 MMOL/L (ref 3.5–5.1)
PROT SERPL-MCNC: 7.5 G/DL (ref 6.4–8.2)
RBC # BLD AUTO: 5.73 X10(6)UL
SODIUM SERPL-SCNC: 137 MMOL/L (ref 136–145)
TRIGL SERPL-MCNC: 87 MG/DL (ref 30–149)
TSI SER-ACNC: 0.89 MIU/ML (ref 0.36–3.74)
VLDLC SERPL CALC-MCNC: 17 MG/DL (ref 0–30)
WBC # BLD AUTO: 6 X10(3) UL (ref 4–11)

## 2023-06-09 PROCEDURE — 80053 COMPREHEN METABOLIC PANEL: CPT

## 2023-06-09 PROCEDURE — 36415 COLL VENOUS BLD VENIPUNCTURE: CPT

## 2023-06-09 PROCEDURE — 85025 COMPLETE CBC W/AUTO DIFF WBC: CPT

## 2023-06-09 PROCEDURE — 80061 LIPID PANEL: CPT

## 2023-06-09 PROCEDURE — 84443 ASSAY THYROID STIM HORMONE: CPT

## 2023-06-14 DIAGNOSIS — E78.2 MIXED HYPERLIPIDEMIA: Primary | ICD-10-CM
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
## 2023-07-10 ENCOUNTER — OFFICE VISIT (OUTPATIENT)
Dept: SURGERY | Facility: CLINIC | Age: 53
End: 2023-07-10

## 2023-07-10 ENCOUNTER — LAB ENCOUNTER (OUTPATIENT)
Dept: LAB | Age: 53
End: 2023-07-10
Attending: UROLOGY
Payer: COMMERCIAL

## 2023-07-10 DIAGNOSIS — E29.1 HYPOGONADISM IN MALE: Primary | ICD-10-CM

## 2023-07-10 DIAGNOSIS — N40.1 BPH WITH OBSTRUCTION/LOWER URINARY TRACT SYMPTOMS: ICD-10-CM

## 2023-07-10 DIAGNOSIS — R06.83 SNORING: ICD-10-CM

## 2023-07-10 DIAGNOSIS — N13.8 BPH WITH OBSTRUCTION/LOWER URINARY TRACT SYMPTOMS: ICD-10-CM

## 2023-07-10 DIAGNOSIS — N52.9 ERECTILE DYSFUNCTION, UNSPECIFIED ERECTILE DYSFUNCTION TYPE: ICD-10-CM

## 2023-07-10 DIAGNOSIS — E29.1 HYPOGONADISM IN MALE: ICD-10-CM

## 2023-07-10 LAB
ERYTHROCYTE [DISTWIDTH] IN BLOOD BY AUTOMATED COUNT: 12.9 %
ESTRADIOL SERPL-MCNC: 56.3 PG/ML
HCT VFR BLD AUTO: 51.5 %
HGB BLD-MCNC: 18 G/DL
MCH RBC QN AUTO: 32 PG (ref 26–34)
MCHC RBC AUTO-ENTMCNC: 35 G/DL (ref 31–37)
MCV RBC AUTO: 91.5 FL
PLATELET # BLD AUTO: 341 10(3)UL (ref 150–450)
RBC # BLD AUTO: 5.63 X10(6)UL
TESTOST SERPL-MCNC: 380.28 NG/DL
WBC # BLD AUTO: 5.6 X10(3) UL (ref 4–11)

## 2023-07-10 PROCEDURE — 36415 COLL VENOUS BLD VENIPUNCTURE: CPT

## 2023-07-10 PROCEDURE — 99214 OFFICE O/P EST MOD 30 MIN: CPT | Performed by: UROLOGY

## 2023-07-10 PROCEDURE — 85027 COMPLETE CBC AUTOMATED: CPT

## 2023-07-10 PROCEDURE — 82670 ASSAY OF TOTAL ESTRADIOL: CPT

## 2023-07-10 PROCEDURE — 84403 ASSAY OF TOTAL TESTOSTERONE: CPT

## 2023-07-10 RX ORDER — SILDENAFIL 100 MG/1
100 TABLET, FILM COATED ORAL
Qty: 30 TABLET | Refills: 11 | Status: SHIPPED | OUTPATIENT
Start: 2023-07-10

## 2023-07-10 RX ORDER — TESTOSTERONE CYPIONATE 200 MG/ML
100 INJECTION, SOLUTION INTRAMUSCULAR
Qty: 10 ML | Refills: 1 | Status: SHIPPED | OUTPATIENT
Start: 2023-07-10

## 2023-07-12 ENCOUNTER — TELEPHONE (OUTPATIENT)
Dept: SURGERY | Facility: CLINIC | Age: 53
End: 2023-07-12

## 2023-07-25 DIAGNOSIS — E53.8 B12 DEFICIENCY: ICD-10-CM

## 2023-07-25 DIAGNOSIS — E29.1 HYPOGONADISM IN MALE: ICD-10-CM

## 2023-07-25 RX ORDER — CYANOCOBALAMIN 1000 UG/ML
INJECTION, SOLUTION INTRAMUSCULAR; SUBCUTANEOUS
Qty: 6 ML | Refills: 0 | Status: SHIPPED | OUTPATIENT
Start: 2023-07-25

## 2023-08-04 DIAGNOSIS — E29.1 HYPOGONADISM IN MALE: ICD-10-CM

## 2023-08-04 RX ORDER — TESTOSTERONE CYPIONATE 200 MG/ML
100 INJECTION, SOLUTION INTRAMUSCULAR
Qty: 10 ML | Refills: 1 | Status: SHIPPED | OUTPATIENT
Start: 2023-08-04

## 2023-10-10 DIAGNOSIS — E53.8 B12 DEFICIENCY: ICD-10-CM

## 2023-10-10 DIAGNOSIS — N52.9 ERECTILE DYSFUNCTION, UNSPECIFIED ERECTILE DYSFUNCTION TYPE: ICD-10-CM

## 2023-10-10 DIAGNOSIS — E29.1 HYPOGONADISM IN MALE: ICD-10-CM

## 2023-10-10 RX ORDER — CYANOCOBALAMIN 1000 UG/ML
INJECTION, SOLUTION INTRAMUSCULAR; SUBCUTANEOUS
Qty: 6 ML | Refills: 0 | Status: SHIPPED | OUTPATIENT
Start: 2023-10-10

## 2023-10-12 RX ORDER — SILDENAFIL 100 MG/1
100 TABLET, FILM COATED ORAL
Qty: 30 TABLET | Refills: 11 | Status: SHIPPED | OUTPATIENT
Start: 2023-10-12

## 2023-10-17 RX ORDER — TESTOSTERONE CYPIONATE 200 MG/ML
100 INJECTION, SOLUTION INTRAMUSCULAR
Qty: 10 ML | Refills: 1 | Status: SHIPPED | OUTPATIENT
Start: 2023-10-17

## 2023-10-30 ENCOUNTER — HOSPITAL ENCOUNTER (EMERGENCY)
Age: 53
Discharge: HOME | End: 2023-10-30
Attending: EMERGENCY MEDICINE
Payer: COMMERCIAL

## 2023-10-30 ENCOUNTER — APPOINTMENT (OUTPATIENT)
Dept: CT IMAGING | Age: 53
End: 2023-10-30
Attending: EMERGENCY MEDICINE
Payer: COMMERCIAL

## 2023-10-30 ENCOUNTER — OFFICE VISIT (OUTPATIENT)
Dept: FAMILY MEDICINE CLINIC | Facility: CLINIC | Age: 53
End: 2023-10-30

## 2023-10-30 VITALS
SYSTOLIC BLOOD PRESSURE: 142 MMHG | OXYGEN SATURATION: 100 % | WEIGHT: 190 LBS | RESPIRATION RATE: 18 BRPM | DIASTOLIC BLOOD PRESSURE: 97 MMHG | TEMPERATURE: 98 F | HEART RATE: 88 BPM | HEIGHT: 66 IN | BODY MASS INDEX: 30.53 KG/M2

## 2023-10-30 VITALS
SYSTOLIC BLOOD PRESSURE: 136 MMHG | DIASTOLIC BLOOD PRESSURE: 70 MMHG | HEART RATE: 82 BPM | RESPIRATION RATE: 16 BRPM | BODY MASS INDEX: 30.53 KG/M2 | OXYGEN SATURATION: 98 % | HEIGHT: 66 IN | TEMPERATURE: 97 F | WEIGHT: 190 LBS

## 2023-10-30 DIAGNOSIS — K57.92 ACUTE DIVERTICULITIS: Primary | ICD-10-CM

## 2023-10-30 DIAGNOSIS — R39.198 DIFFICULTY URINATING: Primary | ICD-10-CM

## 2023-10-30 DIAGNOSIS — N50.819 PAIN IN TESTICLE, UNSPECIFIED LATERALITY: ICD-10-CM

## 2023-10-30 LAB
ALBUMIN SERPL-MCNC: 4.1 G/DL (ref 3.4–5)
ALBUMIN/GLOB SERPL: 1.2 {RATIO} (ref 1–2)
ALP LIVER SERPL-CCNC: 89 U/L
ALT SERPL-CCNC: 38 U/L
ANION GAP SERPL CALC-SCNC: 5 MMOL/L (ref 0–18)
APPEARANCE: CLEAR
AST SERPL-CCNC: 14 U/L (ref 15–37)
BASOPHILS # BLD AUTO: 0.09 X10(3) UL (ref 0–0.2)
BASOPHILS NFR BLD AUTO: 0.6 %
BILIRUB SERPL-MCNC: 1.1 MG/DL (ref 0.1–2)
BILIRUB UR QL STRIP.AUTO: NEGATIVE
BILIRUBIN: NEGATIVE
BUN BLD-MCNC: 13 MG/DL (ref 7–18)
CALCIUM BLD-MCNC: 8.7 MG/DL (ref 8.5–10.1)
CHLORIDE SERPL-SCNC: 105 MMOL/L (ref 98–112)
CLARITY UR REFRACT.AUTO: CLEAR
CO2 SERPL-SCNC: 25 MMOL/L (ref 21–32)
COLOR UR AUTO: YELLOW
CREAT BLD-MCNC: 1.1 MG/DL
EGFRCR SERPLBLD CKD-EPI 2021: 82 ML/MIN/1.73M2 (ref 60–?)
EOSINOPHIL # BLD AUTO: 0.09 X10(3) UL (ref 0–0.7)
EOSINOPHIL NFR BLD AUTO: 0.6 %
ERYTHROCYTE [DISTWIDTH] IN BLOOD BY AUTOMATED COUNT: 12.1 %
GLOBULIN PLAS-MCNC: 3.5 G/DL (ref 2.8–4.4)
GLUCOSE (URINE DIPSTICK): NEGATIVE MG/DL
GLUCOSE BLD-MCNC: 104 MG/DL (ref 70–99)
GLUCOSE UR STRIP.AUTO-MCNC: NEGATIVE MG/DL
HCT VFR BLD AUTO: 53.8 %
HGB BLD-MCNC: 18.8 G/DL
IMM GRANULOCYTES # BLD AUTO: 0.03 X10(3) UL (ref 0–1)
IMM GRANULOCYTES NFR BLD: 0.2 %
KETONES (URINE DIPSTICK): NEGATIVE MG/DL
KETONES UR STRIP.AUTO-MCNC: NEGATIVE MG/DL
LEUKOCYTE ESTERASE UR QL STRIP.AUTO: NEGATIVE
LEUKOCYTES: NEGATIVE
LYMPHOCYTES # BLD AUTO: 2.36 X10(3) UL (ref 1–4)
LYMPHOCYTES NFR BLD AUTO: 16.9 %
MCH RBC QN AUTO: 32.3 PG (ref 26–34)
MCHC RBC AUTO-ENTMCNC: 34.9 G/DL (ref 31–37)
MCV RBC AUTO: 92.4 FL
MONOCYTES # BLD AUTO: 1.16 X10(3) UL (ref 0.1–1)
MONOCYTES NFR BLD AUTO: 8.3 %
MULTISTIX LOT#: ABNORMAL NUMERIC
NEUTROPHILS # BLD AUTO: 10.25 X10 (3) UL (ref 1.5–7.7)
NEUTROPHILS # BLD AUTO: 10.25 X10(3) UL (ref 1.5–7.7)
NEUTROPHILS NFR BLD AUTO: 73.4 %
NITRITE UR QL STRIP.AUTO: NEGATIVE
NITRITE, URINE: NEGATIVE
OSMOLALITY SERPL CALC.SUM OF ELEC: 280 MOSM/KG (ref 275–295)
PH UR STRIP.AUTO: 6 [PH] (ref 5–8)
PH, URINE: 6 (ref 4.5–8)
PLATELET # BLD AUTO: 357 10(3)UL (ref 150–450)
POTASSIUM SERPL-SCNC: 3.9 MMOL/L (ref 3.5–5.1)
PROT SERPL-MCNC: 7.6 G/DL (ref 6.4–8.2)
PROTEIN (URINE DIPSTICK): 30 MG/DL
RBC # BLD AUTO: 5.82 X10(6)UL
SODIUM SERPL-SCNC: 135 MMOL/L (ref 136–145)
SP GR UR STRIP.AUTO: >=1.03 (ref 1–1.03)
SPECIFIC GRAVITY: 1.02 (ref 1–1.03)
UROBILINOGEN UR STRIP.AUTO-MCNC: 1 MG/DL
UROBILINOGEN,SEMI-QN: 1 MG/DL (ref 0–1.9)
WBC # BLD AUTO: 14 X10(3) UL (ref 4–11)

## 2023-10-30 PROCEDURE — 74177 CT ABD & PELVIS W/CONTRAST: CPT | Performed by: EMERGENCY MEDICINE

## 2023-10-30 PROCEDURE — 80053 COMPREHEN METABOLIC PANEL: CPT | Performed by: EMERGENCY MEDICINE

## 2023-10-30 PROCEDURE — 81015 MICROSCOPIC EXAM OF URINE: CPT | Performed by: EMERGENCY MEDICINE

## 2023-10-30 PROCEDURE — 81001 URINALYSIS AUTO W/SCOPE: CPT | Performed by: EMERGENCY MEDICINE

## 2023-10-30 PROCEDURE — 96374 THER/PROPH/DIAG INJ IV PUSH: CPT

## 2023-10-30 PROCEDURE — 99285 EMERGENCY DEPT VISIT HI MDM: CPT

## 2023-10-30 PROCEDURE — 99284 EMERGENCY DEPT VISIT MOD MDM: CPT

## 2023-10-30 PROCEDURE — 85025 COMPLETE CBC W/AUTO DIFF WBC: CPT | Performed by: EMERGENCY MEDICINE

## 2023-10-30 RX ORDER — IOHEXOL 350 MG/ML
100 INJECTION, SOLUTION INTRAVENOUS
Status: COMPLETED | OUTPATIENT
Start: 2023-10-30 | End: 2023-10-30

## 2023-10-30 RX ORDER — KETOROLAC TROMETHAMINE 15 MG/ML
15 INJECTION, SOLUTION INTRAMUSCULAR; INTRAVENOUS ONCE
Status: COMPLETED | OUTPATIENT
Start: 2023-10-30 | End: 2023-10-30

## 2023-10-30 RX ORDER — ONDANSETRON 4 MG/1
4 TABLET, ORALLY DISINTEGRATING ORAL EVERY 4 HOURS PRN
Qty: 10 TABLET | Refills: 0 | Status: SHIPPED | OUTPATIENT
Start: 2023-10-30 | End: 2023-11-06

## 2023-10-30 RX ORDER — METRONIDAZOLE 500 MG/1
500 TABLET ORAL 3 TIMES DAILY
Qty: 30 TABLET | Refills: 0 | Status: SHIPPED | OUTPATIENT
Start: 2023-10-30 | End: 2023-11-09

## 2023-10-30 RX ORDER — HYDROCODONE BITARTRATE AND ACETAMINOPHEN 5; 325 MG/1; MG/1
1-2 TABLET ORAL EVERY 4 HOURS PRN
Qty: 20 TABLET | Refills: 0 | Status: SHIPPED | OUTPATIENT
Start: 2023-10-30 | End: 2023-11-06

## 2023-10-30 RX ORDER — CIPROFLOXACIN 500 MG/1
500 TABLET, FILM COATED ORAL 2 TIMES DAILY
Qty: 20 TABLET | Refills: 0 | Status: SHIPPED | OUTPATIENT
Start: 2023-10-30 | End: 2023-11-09

## 2024-01-04 DIAGNOSIS — E29.1 HYPOGONADISM IN MALE: ICD-10-CM

## 2024-01-04 DIAGNOSIS — E53.8 B12 DEFICIENCY: ICD-10-CM

## 2024-01-04 RX ORDER — TESTOSTERONE CYPIONATE 200 MG/ML
100 INJECTION, SOLUTION INTRAMUSCULAR
Qty: 10 ML | Refills: 1 | OUTPATIENT
Start: 2024-01-04

## 2024-01-05 RX ORDER — CYANOCOBALAMIN 1000 UG/ML
INJECTION, SOLUTION INTRAMUSCULAR; SUBCUTANEOUS
Qty: 6 ML | Refills: 0 | OUTPATIENT
Start: 2024-01-05

## 2024-01-05 NOTE — TELEPHONE ENCOUNTER
Requesting refill on vitamin B12 injections.   Rx request denied.   Per rx request from 10/10/2023, patient to have monthly injections for 3 months.

## 2024-01-09 ENCOUNTER — TELEPHONE (OUTPATIENT)
Dept: SURGERY | Facility: CLINIC | Age: 54
End: 2024-01-09

## 2024-01-09 NOTE — TELEPHONE ENCOUNTER
LMVM advising pt that his 1/10 appt with Saira PIMENTEL needs to be cancelled and rescheduled. Asked pt to call back to reschedule.     Pt aware that appt has been cancelled.

## 2024-02-02 DIAGNOSIS — E53.8 B12 DEFICIENCY: ICD-10-CM

## 2024-02-02 DIAGNOSIS — E29.1 HYPOGONADISM IN MALE: ICD-10-CM

## 2024-02-09 ENCOUNTER — OFFICE VISIT (OUTPATIENT)
Dept: FAMILY MEDICINE CLINIC | Facility: CLINIC | Age: 54
End: 2024-02-09
Payer: COMMERCIAL

## 2024-02-09 VITALS
WEIGHT: 193 LBS | TEMPERATURE: 99 F | RESPIRATION RATE: 18 BRPM | HEART RATE: 100 BPM | SYSTOLIC BLOOD PRESSURE: 132 MMHG | DIASTOLIC BLOOD PRESSURE: 87 MMHG | OXYGEN SATURATION: 97 % | BODY MASS INDEX: 31.02 KG/M2 | HEIGHT: 66 IN

## 2024-02-09 DIAGNOSIS — J11.1 INFLUENZA-LIKE ILLNESS: Primary | ICD-10-CM

## 2024-02-09 DIAGNOSIS — Z20.822 SUSPECTED COVID-19 VIRUS INFECTION: ICD-10-CM

## 2024-02-09 PROCEDURE — 99213 OFFICE O/P EST LOW 20 MIN: CPT | Performed by: NURSE PRACTITIONER

## 2024-02-09 PROCEDURE — 3008F BODY MASS INDEX DOCD: CPT | Performed by: NURSE PRACTITIONER

## 2024-02-09 PROCEDURE — 87637 SARSCOV2&INF A&B&RSV AMP PRB: CPT | Performed by: NURSE PRACTITIONER

## 2024-02-09 PROCEDURE — 3075F SYST BP GE 130 - 139MM HG: CPT | Performed by: NURSE PRACTITIONER

## 2024-02-09 PROCEDURE — 3079F DIAST BP 80-89 MM HG: CPT | Performed by: NURSE PRACTITIONER

## 2024-02-09 RX ORDER — OSELTAMIVIR PHOSPHATE 75 MG/1
75 CAPSULE ORAL 2 TIMES DAILY
Qty: 10 CAPSULE | Refills: 0 | Status: SHIPPED | OUTPATIENT
Start: 2024-02-09 | End: 2024-02-14

## 2024-02-09 NOTE — PROGRESS NOTES
Chief Complaint   Patient presents with    Fever     Fever, achy body, chills, - Entered by patient  S/s for 3 days. Worst at bedtime.  Otc meds taken.      :    HPI:   Audi Clark is a 53 year old male who presents for upper respiratory symptoms for 3 days. Started suddenly.  Symptoms have been worsening since onset.  Feeling feverish, chills, headache, congestion, dry cough, malaise, body aches, weakness, rhinorrhea, no sore throat. Tolerating po.  Denies rash, N/V/D.   Did not receive flu vaccine this season.  unknown influenza exposure. unknown COVID exposure.  Treatment tried: OTC cold meds  Travel outside of [place] in past 3 weeks: no      Current Outpatient Medications   Medication Sig Dispense Refill    oseltamivir 75 MG Oral Cap Take 1 capsule (75 mg total) by mouth 2 (two) times daily for 5 days. 10 capsule 0    testosterone cypionate 200 mg/mL Intramuscular Solution Inject 0.5 mL (100 mg total) into the muscle every 7 days. 10 mL 1    Sildenafil Citrate 100 MG Oral Tab Take 1 tablet (100 mg total) by mouth daily as needed for Erectile Dysfunction. Take ~ one hour prior to sexual activity on an empty stomach 30 tablet 11    Syringe/Needle, Disp, 22G X 1\" 3 ML Does not apply Misc Use needle/syringe to inject testosterone as instructed every week. 24 each 1    cyanocobalamin 1000 MCG/ML Injection Solution 1000 MCG twice monthly for 3 months 6 mL 0    pantoprazole 40 MG Oral Tab EC Take one tablet (40 mg total) by mouth once daily, 30 minutes prior to breakfast. 30 tablet 0      Past Medical History:   Diagnosis Date    Anxiety     Back pain     Bad breath     Chest pain sometimes    Decorative tattoo     Diarrhea, unspecified on and off this past week    Fatigue past year at least    Food intolerance young age / 20's    Heartburn march3    Sleep disturbance     Stress     Wears glasses       Past Surgical History:   Procedure Laterality Date    BRAIN SURGERY        Family History   Problem Relation Age of  Onset    Cancer Father         prostate    Prostate Cancer Father     Diabetes Mother     Diabetes Brother       Social History     Socioeconomic History    Marital status:    Tobacco Use    Smoking status: Some Days     Packs/day: 0     Types: Cigarettes     Passive exposure: Current    Smokeless tobacco: Never   Vaping Use    Vaping Use: Never used   Substance and Sexual Activity    Alcohol use: Yes     Comment: 2x week    Drug use: No   Other Topics Concern    Caffeine Concern Yes     Comment: 1/2 cup day    Exercise No    Seat Belt Yes         REVIEW OF SYSTEMS:   GENERAL: see HPI  SKIN: no rashes  EYES:denies blurred vision or double vision  HEENT: congested; see HPI  CHEST: no chest pains, palpitations.  LUNGS: denies shortness of breath or wheezing.  CARDIOVASCULAR: denies chest pain.  GI: no abdominal pain; see HPI  URO: no decreased urination.      EXAM:   /87   Pulse 100   Temp 99.1 °F (37.3 °C) (Oral)   Resp 18   Ht 5' 6\" (1.676 m)   Wt 193 lb (87.5 kg)   SpO2 97%   BMI 31.15 kg/m²   GENERAL: well developed, well nourished, in no apparent distress, acutely sick.  SKIN: no rashes,no suspicious lesions, flushed.  Warm to touch.  HEAD: atraumatic, normocephalic,  mild tenderness on palpation of  sinuses  EYES: conjunctiva clear  EARS: TM's clear gray, no bulging, no retraction, no fluid, bony landmarks visible  NOSE: nostrils patent, clear nasal mucous, nasal mucosa reddened and swollen  THROAT: oral mucosa pink, moist. Posterior pharynx is not erythematous. no exudates.  NECK: supple, non-tender  LUNGS: clear to auscultation bilaterally, no wheezes or rhonchi. Breathing is non labored.  Dry cough.  CARDIO: RRR without murmur  GI: good BS's,no masses, HSM or tenderness  EXTREMITIES: no cyanosis, clubbing or edema  LYMPH:  + anterior cervical lymphadenopathy.        ASSESSMENT AND PLAN:   Audi Clark is a 53 year old male who presents with flu-like symptoms    ASSESSMENT:  Encounter  Diagnoses   Name Primary?    Suspected COVID-19 virus infection     Influenza-like illness Yes       PLAN:  Quad panel sent.  Natural course of influenza, possible complications, CDC recommendations, and treatment options discussed.  Patient has elected to start Tamiflu after discussion of risks and benefits. Explained may lessen severity and duration of symptoms, but will not completely remove symptoms.  Will stop if negative influenza.  Rest, increase fluids,ibuprofen/tylenol q 6 hours for fever/aches prn.   Discussed OTC options for symptom relief.    Complications of influenza discussed including secondary infections such as AOM, bronchitis, PNA, sinusitis.  To be rechecked if exhibiting any symptoms of these illnesses.   To f/u with PCP in 3-4 days if sx's persist. Seek immediate medical attention for acute or worsening symptoms.   Verbalizes understanding of these issues and agrees to the plan.    Meds & Refills for this Visit:  Requested Prescriptions     Signed Prescriptions Disp Refills    oseltamivir 75 MG Oral Cap 10 capsule 0     Sig: Take 1 capsule (75 mg total) by mouth 2 (two) times daily for 5 days.         Patient Instructions   I recommend the 4 H's for inflammation:    1. Heat (warm mist from the shower or warm liquids such as tea)  2. Honey (mixed in your tea or by the spoonful [if you are not diabetic; over the age of 1 year]--take a spoonful 3 times a day and don't eat or drink anything for 15-20 minutes)  3. Humidity--cool mist in the bedroom at night  4. Hydration --at least 8 -10 glasses a day

## 2024-02-10 LAB
FLUAV + FLUBV RNA SPEC NAA+PROBE: NOT DETECTED
FLUAV + FLUBV RNA SPEC NAA+PROBE: NOT DETECTED
RSV RNA SPEC NAA+PROBE: NOT DETECTED
SARS-COV-2 RNA RESP QL NAA+PROBE: DETECTED

## 2024-02-13 RX ORDER — CYANOCOBALAMIN 1000 UG/ML
INJECTION, SOLUTION INTRAMUSCULAR; SUBCUTANEOUS
Qty: 6 ML | Refills: 0 | Status: SHIPPED | OUTPATIENT
Start: 2024-02-13

## 2024-04-22 DIAGNOSIS — E29.1 HYPOGONADISM IN MALE: ICD-10-CM

## 2024-04-22 DIAGNOSIS — N52.9 ERECTILE DYSFUNCTION, UNSPECIFIED ERECTILE DYSFUNCTION TYPE: ICD-10-CM

## 2024-04-23 RX ORDER — SILDENAFIL 100 MG/1
100 TABLET, FILM COATED ORAL
Qty: 30 TABLET | Refills: 0 | Status: SHIPPED | OUTPATIENT
Start: 2024-04-23

## 2024-04-23 RX ORDER — TESTOSTERONE CYPIONATE 200 MG/ML
100 INJECTION, SOLUTION INTRAMUSCULAR
Qty: 10 ML | Refills: 1 | OUTPATIENT
Start: 2024-04-23

## 2024-05-02 ENCOUNTER — TELEPHONE (OUTPATIENT)
Dept: SURGERY | Facility: CLINIC | Age: 54
End: 2024-05-02

## 2024-05-02 ENCOUNTER — LAB ENCOUNTER (OUTPATIENT)
Dept: LAB | Age: 54
End: 2024-05-02
Attending: UROLOGY
Payer: COMMERCIAL

## 2024-05-02 DIAGNOSIS — E29.1 HYPOGONADISM IN MALE: ICD-10-CM

## 2024-05-02 DIAGNOSIS — E29.1 HYPOGONADISM IN MALE: Primary | ICD-10-CM

## 2024-05-02 DIAGNOSIS — E78.2 MIXED HYPERLIPIDEMIA: ICD-10-CM

## 2024-05-02 LAB
ALBUMIN SERPL-MCNC: 4.2 G/DL (ref 3.4–5)
ALBUMIN/GLOB SERPL: 1.3 {RATIO} (ref 1–2)
ALP LIVER SERPL-CCNC: 76 U/L
ALT SERPL-CCNC: 51 U/L
ANION GAP SERPL CALC-SCNC: 8 MMOL/L (ref 0–18)
AST SERPL-CCNC: 18 U/L (ref 15–37)
BILIRUB SERPL-MCNC: 0.8 MG/DL (ref 0.1–2)
BUN BLD-MCNC: 15 MG/DL (ref 9–23)
CALCIUM BLD-MCNC: 9.9 MG/DL (ref 8.5–10.1)
CHLORIDE SERPL-SCNC: 105 MMOL/L (ref 98–112)
CHOLEST SERPL-MCNC: 230 MG/DL (ref ?–200)
CO2 SERPL-SCNC: 25 MMOL/L (ref 21–32)
CREAT BLD-MCNC: 1.38 MG/DL
EGFRCR SERPLBLD CKD-EPI 2021: 62 ML/MIN/1.73M2 (ref 60–?)
FASTING PATIENT LIPID ANSWER: YES
FASTING STATUS PATIENT QL REPORTED: YES
GLOBULIN PLAS-MCNC: 3.3 G/DL (ref 2.8–4.4)
GLUCOSE BLD-MCNC: 119 MG/DL (ref 70–99)
HDLC SERPL-MCNC: 36 MG/DL (ref 40–59)
LDLC SERPL CALC-MCNC: 159 MG/DL (ref ?–100)
NONHDLC SERPL-MCNC: 194 MG/DL (ref ?–130)
OSMOLALITY SERPL CALC.SUM OF ELEC: 288 MOSM/KG (ref 275–295)
POTASSIUM SERPL-SCNC: 3.7 MMOL/L (ref 3.5–5.1)
PROT SERPL-MCNC: 7.5 G/DL (ref 6.4–8.2)
PSA SERPL-MCNC: 1.8 NG/ML (ref ?–4)
SODIUM SERPL-SCNC: 138 MMOL/L (ref 136–145)
TRIGL SERPL-MCNC: 189 MG/DL (ref 30–149)
VLDLC SERPL CALC-MCNC: 37 MG/DL (ref 0–30)

## 2024-05-02 PROCEDURE — 84153 ASSAY OF PSA TOTAL: CPT

## 2024-05-02 PROCEDURE — 80053 COMPREHEN METABOLIC PANEL: CPT

## 2024-05-02 PROCEDURE — 80061 LIPID PANEL: CPT

## 2024-05-02 PROCEDURE — 36415 COLL VENOUS BLD VENIPUNCTURE: CPT

## 2024-05-02 NOTE — TELEPHONE ENCOUNTER
Patient has an appointment tomorrow 05.03.24 and is asking if they need a PSA done for tomorrow before their appointment. Please advise.

## 2024-05-03 ENCOUNTER — OFFICE VISIT (OUTPATIENT)
Dept: SURGERY | Facility: CLINIC | Age: 54
End: 2024-05-03
Payer: COMMERCIAL

## 2024-05-03 DIAGNOSIS — N13.8 BPH WITH OBSTRUCTION/LOWER URINARY TRACT SYMPTOMS: ICD-10-CM

## 2024-05-03 DIAGNOSIS — E29.1 HYPOGONADISM IN MALE: Primary | ICD-10-CM

## 2024-05-03 DIAGNOSIS — N40.1 BPH WITH OBSTRUCTION/LOWER URINARY TRACT SYMPTOMS: ICD-10-CM

## 2024-05-03 DIAGNOSIS — N52.9 ERECTILE DYSFUNCTION, UNSPECIFIED ERECTILE DYSFUNCTION TYPE: ICD-10-CM

## 2024-05-03 PROCEDURE — 99214 OFFICE O/P EST MOD 30 MIN: CPT | Performed by: UROLOGY

## 2024-05-03 RX ORDER — TADALAFIL 5 MG/1
5 TABLET ORAL
Qty: 90 TABLET | Refills: 5 | Status: SHIPPED | OUTPATIENT
Start: 2024-05-03

## 2024-05-03 RX ORDER — TADALAFIL 5 MG/1
5 TABLET ORAL
Qty: 30 TABLET | Refills: 11 | Status: SHIPPED | OUTPATIENT
Start: 2024-05-03 | End: 2024-05-03
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
## 2024-05-06 ENCOUNTER — LAB ENCOUNTER (OUTPATIENT)
Dept: LAB | Age: 54
End: 2024-05-06
Attending: UROLOGY
Payer: COMMERCIAL

## 2024-05-06 ENCOUNTER — PATIENT MESSAGE (OUTPATIENT)
Dept: SURGERY | Facility: CLINIC | Age: 54
End: 2024-05-06

## 2024-05-06 LAB
ERYTHROCYTE [DISTWIDTH] IN BLOOD BY AUTOMATED COUNT: 12.1 %
ESTRADIOL SERPL-MCNC: 55.3 PG/ML
HCT VFR BLD AUTO: 50.8 %
HGB BLD-MCNC: 17.5 G/DL
MCH RBC QN AUTO: 32 PG (ref 26–34)
MCHC RBC AUTO-ENTMCNC: 34.4 G/DL (ref 31–37)
MCV RBC AUTO: 92.9 FL
PLATELET # BLD AUTO: 369 10(3)UL (ref 150–450)
RBC # BLD AUTO: 5.47 X10(6)UL
TESTOST SERPL-MCNC: 525.91 NG/DL
WBC # BLD AUTO: 6.9 X10(3) UL (ref 4–11)

## 2024-05-06 PROCEDURE — 85027 COMPLETE CBC AUTOMATED: CPT | Performed by: UROLOGY

## 2024-05-06 PROCEDURE — 36415 COLL VENOUS BLD VENIPUNCTURE: CPT | Performed by: UROLOGY

## 2024-05-06 PROCEDURE — 82670 ASSAY OF TOTAL ESTRADIOL: CPT | Performed by: UROLOGY

## 2024-05-06 PROCEDURE — 84403 ASSAY OF TOTAL TESTOSTERONE: CPT | Performed by: UROLOGY

## 2024-05-08 NOTE — TELEPHONE ENCOUNTER
----- Message from Arslan Garg sent at 5/7/2024  8:10 AM CDT -----  Results reviewed. Please schedule patient for a 6 mo visit with PA or me with T labs prior. I ordered his T injections and labs for next visit.    Thanks,  [facility]

## 2024-05-09 DIAGNOSIS — E29.1 HYPOGONADISM IN MALE: ICD-10-CM

## 2024-05-09 DIAGNOSIS — E53.8 B12 DEFICIENCY: ICD-10-CM

## 2024-05-09 RX ORDER — CYANOCOBALAMIN 1000 UG/ML
INJECTION, SOLUTION INTRAMUSCULAR; SUBCUTANEOUS
Qty: 6 ML | Refills: 0 | Status: SHIPPED | OUTPATIENT
Start: 2024-05-09

## 2024-05-10 ENCOUNTER — TELEPHONE (OUTPATIENT)
Dept: FAMILY MEDICINE CLINIC | Facility: CLINIC | Age: 54
End: 2024-05-10

## 2024-05-16 RX ORDER — TESTOSTERONE CYPIONATE 200 MG/ML
100 INJECTION, SOLUTION INTRAMUSCULAR
Qty: 10 ML | Refills: 3 | OUTPATIENT
Start: 2024-05-16

## 2024-05-20 ENCOUNTER — OFFICE VISIT (OUTPATIENT)
Dept: FAMILY MEDICINE CLINIC | Facility: CLINIC | Age: 54
End: 2024-05-20

## 2024-05-20 VITALS
RESPIRATION RATE: 16 BRPM | HEIGHT: 66 IN | WEIGHT: 198 LBS | DIASTOLIC BLOOD PRESSURE: 78 MMHG | BODY MASS INDEX: 31.82 KG/M2 | TEMPERATURE: 98 F | OXYGEN SATURATION: 95 % | SYSTOLIC BLOOD PRESSURE: 118 MMHG | HEART RATE: 87 BPM

## 2024-05-20 DIAGNOSIS — Z00.00 ROUTINE GENERAL MEDICAL EXAMINATION AT HEALTH CARE FACILITY: Primary | ICD-10-CM

## 2024-05-20 DIAGNOSIS — T78.40XD ALLERGIC REACTION, SUBSEQUENT ENCOUNTER: ICD-10-CM

## 2024-05-20 DIAGNOSIS — E78.2 MIXED HYPERLIPIDEMIA: ICD-10-CM

## 2024-05-20 DIAGNOSIS — R73.9 HYPERGLYCEMIA: ICD-10-CM

## 2024-05-20 PROBLEM — K57.90 DIVERTICULOSIS: Status: RESOLVED | Noted: 2021-03-25 | Resolved: 2024-05-20

## 2024-05-20 PROBLEM — K57.92 DIVERTICULITIS: Status: RESOLVED | Noted: 2021-03-12 | Resolved: 2024-05-20

## 2024-05-20 PROCEDURE — 3008F BODY MASS INDEX DOCD: CPT | Performed by: FAMILY MEDICINE

## 2024-05-20 PROCEDURE — 99396 PREV VISIT EST AGE 40-64: CPT | Performed by: FAMILY MEDICINE

## 2024-05-20 PROCEDURE — 3078F DIAST BP <80 MM HG: CPT | Performed by: FAMILY MEDICINE

## 2024-05-20 PROCEDURE — 3074F SYST BP LT 130 MM HG: CPT | Performed by: FAMILY MEDICINE

## 2024-05-20 RX ORDER — EPINEPHRINE 0.3 MG/.3ML
0.3 INJECTION SUBCUTANEOUS ONCE
Qty: 2 EACH | Refills: 3 | Status: SHIPPED | OUTPATIENT
Start: 2024-05-20 | End: 2024-05-20

## 2024-05-21 NOTE — PROGRESS NOTES
HPI:    Audi Clark is a 53 year old male who presents for Physical (Labs results. )     Presenting for annual physical and follow-up.  Patient initially saw me for worsening fatigue was started on testosterone with some improvement also on B12 injections has been off of that for couple months.  Patient reports that he had no acute fatigue or chest pain had no side effects since starting testosterone and B12 he reports that he continues to have a little bit of fatigue which did improve when he took the B12 injections patient has a history of mixed hyperlipidemia currently on diet control for now he reports no other complaints he reports mood has been improved since his energy has gotten better does have a history of reflux of which he takes over-the-counter PPI.        Past History:   He  has a past medical history of Anxiety, Back pain, Bad breath, Chest pain (sometimes), Decorative tattoo, Depression, Diarrhea, unspecified (on and off this past week), Fatigue (past year at least), Food intolerance (young age / 20's), Heartburn (march3), Sleep apnea, Sleep disturbance, Stress, and Wears glasses.   He  has a past surgical history that includes Brain Surgery; colonoscopy (2021); tonsillectomy (1980); and vasectomy (2015).   His family history includes Asthma in his mother; Cancer in his father; Diabetes in his brother and mother; Prostate Cancer in his father.   He  reports that he has been smoking cigarettes. He has been exposed to tobacco smoke. He has never used smokeless tobacco. He reports current alcohol use of about 5.0 standard drinks of alcohol per week. He reports that he does not use drugs.     He is not on any long-term medications.   He has No Known Allergies.     Current Outpatient Medications on File Prior to Visit   Medication Sig    cyanocobalamin 1000 MCG/ML Injection Solution 1000 MCG twice monthly for 3 months    testosterone cypionate 200 mg/mL Intramuscular Solution Inject 0.5 mL (100 mg  total) into the muscle every 7 days.    Syringe/Needle, Disp, 22G X 1\" 3 ML Does not apply Misc Use needle/syringe to inject testosterone as instructed every week.    tadalafil (CIALIS) 5 MG Oral Tab Take 1 tablet (5 mg total) by mouth daily as needed for Erectile Dysfunction.    Sildenafil Citrate 100 MG Oral Tab Take 1 tablet (100 mg total) by mouth daily as needed for Erectile Dysfunction. Take ~ one hour prior to sexual activity on an empty stomach (Patient not taking: Reported on 5/20/2024)     No current facility-administered medications on file prior to visit.         REVIEW OF SYSTEMS:   Patient denies shortness of breath, denies chest pain and denies any recent fevers or chills.    Patient reports no urinary complaints and denies headaches or visual disturbances.   Patient denies any abdominal pain at this time. Patient has no new skin lesions.  Patient reports no acute back pain and reports no dizziness or headaches.   Patient reports no visual disturbances and reports hearing has been about the same.   Patient reports no recent injury or trauma.               EXAM:    /78   Pulse 87   Temp 98 °F (36.7 °C)   Resp 16   Ht 5' 6\" (1.676 m)   Wt 198 lb (89.8 kg)   SpO2 95%   BMI 31.96 kg/m²  Estimated body mass index is 31.96 kg/m² as calculated from the following:    Height as of this encounter: 5' 6\" (1.676 m).    Weight as of this encounter: 198 lb (89.8 kg).    General Appearance:  Alert, cooperative, no distress, appears stated age   Head:  Normocephalic, without obvious abnormality, atraumatic   Eyes:  PERRL, conjunctiva/corneas clear, EOM's intact, fundi benign, both eyes   Ears:  Normal TM's and external ear canals, both ears   Nose: Nares normal, septum midline, mucosa normal, no drainage or sinus tenderness   Throat: Lips, mucosa, and tongue normal; teeth and gums normal   Neck: Supple, symmetrical, trachea midline, no adenopathy, thyroid: not enlarged, symmetric, no  tenderness/mass/nodules, no carotid bruit or JVD   Back:   Symmetric, no curvature, ROM normal, no CVA tenderness   Lungs:   Clear to auscultation bilaterally, respirations unlabored   Chest Wall:  No tenderness or deformity   Heart:  Regular rate and rhythm, S1, S2 normal, no murmur, rub or gallop   Abdomen:   Soft, non-tender, bowel sounds active all four quadrants,  no masses, no organomegaly   Genitalia:     Rectal:     Extremities: Extremities normal, atraumatic, no cyanosis or edema   Pulses: 2+ and symmetric   Skin: Skin color, texture, turgor normal, no rashes or lesions   Lymph nodes: Cervical, supraclavicular, and axillary nodes normal   Neurologic: Normal   Mood is better!            ASSESSMENT AND PLAN:   Diagnoses and all orders for this visit:    1. Routine general medical examination at health care facility  -wellness visit was done  - CBC With Differential With Platelet; Future  - Comp Metabolic Panel (14); Future  - Lipid Panel; Future  - TSH W Reflex To Free T4; Future    2. Mixed hyperlipidemia  -due for CA score  - CT CALCIUM SCORING; Future    3. Hyperglycemia  -will check labs as below:  - Hemoglobin A1C [E]; Future  - CT CALCIUM SCORING; Future    4. Allergic reaction, subsequent encounter  -had some throat closing sensation with respiratory distress.   -will give rx as below just in case symptoms recur.   - EPINEPHrine 0.3 MG/0.3ML Injection Solution Auto-injector; Inject 0.3 mL (1 each total) as directed one time for 1 dose.  Dispense: 2 each; Refill: 3     Pt verbalized understanding and has no further questions at this time.  Over 25 minutes was spent with patient reviewing medication, reviewing labs, and medical plan.    Greater than 50% of visit spent on education and counseling.  Office Follow up visit: 12 months.       Gunnar Hurd MD,

## 2024-06-10 ENCOUNTER — OFFICE VISIT (OUTPATIENT)
Dept: FAMILY MEDICINE CLINIC | Facility: CLINIC | Age: 54
End: 2024-06-10
Payer: COMMERCIAL

## 2024-06-10 VITALS
BODY MASS INDEX: 31.02 KG/M2 | DIASTOLIC BLOOD PRESSURE: 80 MMHG | HEIGHT: 66 IN | TEMPERATURE: 98 F | WEIGHT: 193 LBS | RESPIRATION RATE: 18 BRPM | OXYGEN SATURATION: 95 % | SYSTOLIC BLOOD PRESSURE: 124 MMHG | HEART RATE: 102 BPM

## 2024-06-10 DIAGNOSIS — J02.9 SORE THROAT: ICD-10-CM

## 2024-06-10 DIAGNOSIS — Z20.822 SUSPECTED COVID-19 VIRUS INFECTION: Primary | ICD-10-CM

## 2024-06-10 LAB
CONTROL LINE PRESENT WITH A CLEAR BACKGROUND (YES/NO): YES YES/NO
KIT LOT #: NORMAL NUMERIC
OPERATOR ID: NORMAL
POCT LOT NUMBER: NORMAL
RAPID SARS-COV-2 BY PCR: NOT DETECTED
STREP GRP A CUL-SCR: NEGATIVE

## 2024-06-10 NOTE — PROGRESS NOTES
CHIEF COMPLAINT:     Chief Complaint   Patient presents with    Sore Throat     Chills and running nose for 4 days. OTC Dayquil taken.         HPI:   Audi Clark is a 53 year old male who presents for upper respiratory symptoms for  4 days .  Patient reports  sore throat and nausea, occasional cough which he is attributing to some thick mucous . Symptoms have been persisting since onset.  Treating symptoms with none.  Denies SOB, loss of taste or smell. Denies cough. Patient will be traveling in 2 days and requesting treatment. He denies fever. Tried DayQuil.     Current Outpatient Medications   Medication Sig Dispense Refill    cyanocobalamin 1000 MCG/ML Injection Solution 1000 MCG twice monthly for 3 months 6 mL 0    testosterone cypionate 200 mg/mL Intramuscular Solution Inject 0.5 mL (100 mg total) into the muscle every 7 days. 10 mL 3    Syringe/Needle, Disp, 22G X 1\" 3 ML Does not apply Misc Use needle/syringe to inject testosterone as instructed every week. 24 each 1    tadalafil (CIALIS) 5 MG Oral Tab Take 1 tablet (5 mg total) by mouth daily as needed for Erectile Dysfunction. 90 tablet 5    Sildenafil Citrate 100 MG Oral Tab Take 1 tablet (100 mg total) by mouth daily as needed for Erectile Dysfunction. Take ~ one hour prior to sexual activity on an empty stomach (Patient not taking: Reported on 5/20/2024) 30 tablet 0      Past Medical History:    Anxiety    Back pain    Bad breath    Chest pain    Decorative tattoo    Depression    Diarrhea, unspecified    Fatigue    Food intolerance    Heartburn    Sleep apnea    Sleep disturbance    Stress    Wears glasses      Past Surgical History:   Procedure Laterality Date    Brain surgery      Colonoscopy  2021    Tonsillectomy  1980    Vasectomy  2015         Social History     Socioeconomic History    Marital status:    Tobacco Use    Smoking status: Some Days     Types: Cigarettes     Passive exposure: Current    Smokeless tobacco: Never   Vaping  Use    Vaping status: Never Used   Substance and Sexual Activity    Alcohol use: Yes     Alcohol/week: 5.0 standard drinks of alcohol     Types: 5 Cans of beer per week     Comment: 2x week    Drug use: No   Other Topics Concern    Caffeine Concern Yes    Stress Concern No    Weight Concern No    Special Diet Yes    Exercise Yes    Seat Belt Yes         REVIEW OF SYSTEMS:   GENERAL: no change in appetite  SKIN: no rashes or abnormal skin lesions  HEENT: See HPI  LUNGS: See HPI  CARDIOVASCULAR: denies chest pain or palpitations   GI: denies N/V/C or abdominal pain      EXAM:   /80   Pulse 102   Temp 98.2 °F (36.8 °C) (Oral)   Resp 18   Ht 5' 6\" (1.676 m)   Wt 193 lb (87.5 kg)   SpO2 95%   BMI 31.15 kg/m²   GENERAL: well developed, well nourished,in no apparent distress  SKIN: no rashes,no suspicious lesions  HEAD: atraumatic, normocephalic.    EYES: conjunctiva clear, EOM intact  EARS: TM's bilaterally non-erythematous, no bulging, no retraction, no fluid, bony landmarks clearly visualized  NOSE: Nostrils patent, no nasal discharge, nasal mucosa pink and non-inflamed   THROAT: Oral mucosa pink, moist. Posterior pharynx is mildly erythematous. No exudates. Tonsils 2/4.    NECK: Supple, non-tender  LUNGS: clear to auscultation bilaterally, no wheezes or rhonchi. Breathing is non labored.  CARDIO: RRR without murmur  EXTREMITIES: no cyanosis, clubbing or edema  LYMPH:  No lymphadenopathy.        ASSESSMENT AND PLAN:   Audi Clark is a 53 year old male who presents with upper respiratory symptoms that are consistent with    ASSESSMENT:     Encounter Diagnoses   Name Primary?    Suspected COVID-19 virus infection Yes    Sore throat        Orders Placed This Encounter   Procedures    Rapid Strep    Rapid Covid-19   NEGATIVE    Meds & Refills for this Visit:  Requested Prescriptions      No prescriptions requested or ordered in this encounter       Imaging & Consults:  None      PLAN: Comfort care as  described in Patient Instructions    The patient indicates understanding of these issues and agrees to the plan.  The patient is asked to f/u with PCP in 1 week if sx's persist or worsen.

## 2024-08-05 DIAGNOSIS — E29.1 HYPOGONADISM IN MALE: ICD-10-CM

## 2024-08-05 DIAGNOSIS — N40.1 BPH WITH OBSTRUCTION/LOWER URINARY TRACT SYMPTOMS: ICD-10-CM

## 2024-08-05 DIAGNOSIS — E53.8 B12 DEFICIENCY: ICD-10-CM

## 2024-08-05 DIAGNOSIS — N13.8 BPH WITH OBSTRUCTION/LOWER URINARY TRACT SYMPTOMS: ICD-10-CM

## 2024-08-06 RX ORDER — CYANOCOBALAMIN 1000 UG/ML
INJECTION, SOLUTION INTRAMUSCULAR; SUBCUTANEOUS
Qty: 6 ML | Refills: 0 | Status: SHIPPED | OUTPATIENT
Start: 2024-08-06

## 2024-08-08 RX ORDER — TESTOSTERONE CYPIONATE 200 MG/ML
100 INJECTION, SOLUTION INTRAMUSCULAR
Qty: 10 ML | Refills: 3 | Status: SHIPPED | OUTPATIENT
Start: 2024-08-08

## 2024-08-08 RX ORDER — TADALAFIL 5 MG/1
5 TABLET ORAL
Qty: 90 TABLET | Refills: 5 | Status: SHIPPED | OUTPATIENT
Start: 2024-08-08

## 2024-10-16 DIAGNOSIS — E29.1 HYPOGONADISM IN MALE: ICD-10-CM

## 2024-11-30 DIAGNOSIS — E53.8 B12 DEFICIENCY: ICD-10-CM

## 2024-11-30 DIAGNOSIS — E29.1 HYPOGONADISM IN MALE: ICD-10-CM

## 2024-12-02 RX ORDER — TESTOSTERONE CYPIONATE 200 MG/ML
100 INJECTION, SOLUTION INTRAMUSCULAR
Qty: 2 ML | Refills: 0 | Status: SHIPPED | OUTPATIENT
Start: 2024-12-02

## 2024-12-03 RX ORDER — CYANOCOBALAMIN 1000 UG/ML
INJECTION, SOLUTION INTRAMUSCULAR; SUBCUTANEOUS
Qty: 6 ML | Refills: 0 | Status: SHIPPED | OUTPATIENT
Start: 2024-12-03

## 2025-01-07 ENCOUNTER — PATIENT MESSAGE (OUTPATIENT)
Dept: SURGERY | Facility: CLINIC | Age: 55
End: 2025-01-07

## 2025-01-14 ENCOUNTER — PATIENT MESSAGE (OUTPATIENT)
Dept: SURGERY | Facility: CLINIC | Age: 55
End: 2025-01-14

## 2025-01-25 ENCOUNTER — LAB ENCOUNTER (OUTPATIENT)
Dept: LAB | Age: 55
End: 2025-01-25
Attending: UROLOGY
Payer: COMMERCIAL

## 2025-01-25 DIAGNOSIS — E29.1 HYPOGONADISM IN MALE: ICD-10-CM

## 2025-01-25 DIAGNOSIS — R73.9 HYPERGLYCEMIA: ICD-10-CM

## 2025-01-25 DIAGNOSIS — Z00.00 ROUTINE GENERAL MEDICAL EXAMINATION AT HEALTH CARE FACILITY: ICD-10-CM

## 2025-01-25 LAB
ALBUMIN SERPL-MCNC: 4.7 G/DL (ref 3.2–4.8)
ALBUMIN/GLOB SERPL: 1.6 {RATIO} (ref 1–2)
ALP LIVER SERPL-CCNC: 67 U/L
ALT SERPL-CCNC: 28 U/L
ANION GAP SERPL CALC-SCNC: 3 MMOL/L (ref 0–18)
AST SERPL-CCNC: 21 U/L (ref ?–34)
BASOPHILS # BLD AUTO: 0.13 X10(3) UL (ref 0–0.2)
BASOPHILS NFR BLD AUTO: 1.6 %
BILIRUB SERPL-MCNC: 0.6 MG/DL (ref 0.3–1.2)
BUN BLD-MCNC: 12 MG/DL (ref 9–23)
CALCIUM BLD-MCNC: 9.9 MG/DL (ref 8.7–10.6)
CHLORIDE SERPL-SCNC: 107 MMOL/L (ref 98–112)
CHOLEST SERPL-MCNC: 232 MG/DL (ref ?–200)
CO2 SERPL-SCNC: 30 MMOL/L (ref 21–32)
CREAT BLD-MCNC: 1.24 MG/DL
EGFRCR SERPLBLD CKD-EPI 2021: 70 ML/MIN/1.73M2 (ref 60–?)
EOSINOPHIL # BLD AUTO: 0.33 X10(3) UL (ref 0–0.7)
EOSINOPHIL NFR BLD AUTO: 4.1 %
ERYTHROCYTE [DISTWIDTH] IN BLOOD BY AUTOMATED COUNT: 12.6 %
EST. AVERAGE GLUCOSE BLD GHB EST-MCNC: 114 MG/DL (ref 68–126)
ESTRADIOL SERPL-MCNC: 54.2 PG/ML
FASTING PATIENT LIPID ANSWER: YES
FASTING STATUS PATIENT QL REPORTED: YES
GLOBULIN PLAS-MCNC: 2.9 G/DL (ref 2–3.5)
GLUCOSE BLD-MCNC: 96 MG/DL (ref 70–99)
HBA1C MFR BLD: 5.6 % (ref ?–5.7)
HCT VFR BLD AUTO: 52.2 %
HDLC SERPL-MCNC: 36 MG/DL (ref 40–59)
HGB BLD-MCNC: 17.6 G/DL
IMM GRANULOCYTES # BLD AUTO: 0.05 X10(3) UL (ref 0–1)
IMM GRANULOCYTES NFR BLD: 0.6 %
LDLC SERPL CALC-MCNC: 176 MG/DL (ref ?–100)
LYMPHOCYTES # BLD AUTO: 1.98 X10(3) UL (ref 1–4)
LYMPHOCYTES NFR BLD AUTO: 24.6 %
MCH RBC QN AUTO: 31.3 PG (ref 26–34)
MCHC RBC AUTO-ENTMCNC: 33.7 G/DL (ref 31–37)
MCV RBC AUTO: 92.9 FL
MONOCYTES # BLD AUTO: 0.71 X10(3) UL (ref 0.1–1)
MONOCYTES NFR BLD AUTO: 8.8 %
NEUTROPHILS # BLD AUTO: 4.84 X10 (3) UL (ref 1.5–7.7)
NEUTROPHILS # BLD AUTO: 4.84 X10(3) UL (ref 1.5–7.7)
NEUTROPHILS NFR BLD AUTO: 60.3 %
NONHDLC SERPL-MCNC: 196 MG/DL (ref ?–130)
OSMOLALITY SERPL CALC.SUM OF ELEC: 290 MOSM/KG (ref 275–295)
PLATELET # BLD AUTO: 395 10(3)UL (ref 150–450)
POTASSIUM SERPL-SCNC: 4.5 MMOL/L (ref 3.5–5.1)
PROT SERPL-MCNC: 7.6 G/DL (ref 5.7–8.2)
RBC # BLD AUTO: 5.62 X10(6)UL
SODIUM SERPL-SCNC: 140 MMOL/L (ref 136–145)
TESTOST SERPL-MCNC: 306.29 NG/DL
TRIGL SERPL-MCNC: 108 MG/DL (ref 30–149)
TSI SER-ACNC: 0.77 UIU/ML (ref 0.55–4.78)
VLDLC SERPL CALC-MCNC: 22 MG/DL (ref 0–30)
WBC # BLD AUTO: 8 X10(3) UL (ref 4–11)

## 2025-01-25 PROCEDURE — 84403 ASSAY OF TOTAL TESTOSTERONE: CPT

## 2025-01-25 PROCEDURE — 36415 COLL VENOUS BLD VENIPUNCTURE: CPT

## 2025-01-25 PROCEDURE — 80061 LIPID PANEL: CPT

## 2025-01-25 PROCEDURE — 85025 COMPLETE CBC W/AUTO DIFF WBC: CPT

## 2025-01-25 PROCEDURE — 82670 ASSAY OF TOTAL ESTRADIOL: CPT

## 2025-01-25 PROCEDURE — 84443 ASSAY THYROID STIM HORMONE: CPT

## 2025-01-25 PROCEDURE — 80053 COMPREHEN METABOLIC PANEL: CPT

## 2025-01-25 PROCEDURE — 83036 HEMOGLOBIN GLYCOSYLATED A1C: CPT

## 2025-01-29 ENCOUNTER — PATIENT MESSAGE (OUTPATIENT)
Dept: SURGERY | Facility: CLINIC | Age: 55
End: 2025-01-29

## 2025-01-29 DIAGNOSIS — Z00.00 ROUTINE GENERAL MEDICAL EXAMINATION AT HEALTH CARE FACILITY: ICD-10-CM

## 2025-02-05 ENCOUNTER — TELEMEDICINE (OUTPATIENT)
Dept: SURGERY | Facility: CLINIC | Age: 55
End: 2025-02-05
Payer: COMMERCIAL

## 2025-02-05 DIAGNOSIS — E29.1 HYPOGONADISM IN MALE: ICD-10-CM

## 2025-02-05 PROCEDURE — 98004 SYNCH AUDIO-VIDEO EST SF 10: CPT | Performed by: PHYSICIAN ASSISTANT

## 2025-02-05 RX ORDER — TESTOSTERONE CYPIONATE 200 MG/ML
100 INJECTION, SOLUTION INTRAMUSCULAR
Qty: 4 ML | Refills: 2 | Status: SHIPPED | OUTPATIENT
Start: 2025-02-05

## 2025-02-05 NOTE — PROGRESS NOTES
Subjective:   Virtual Video Check-In    Audi Clark verbally consents to a Virtual Video Check-In service on 02/05/25.  This is a telemedicine visit with live, interactive video and audio.   Patient understands and accepts financial responsibility for any deductible, co-insurance and/or co-pays associated with this service.    Duration of the service: 10 minutes    Summary of topics discussed:  See below    Audi Clark is a 54 year old male with hx of anxiety, PTSD, insomnia, KYLIE, who presents for follow up hypogonadism.    Patient last seen by Dr. Garg in May 2024 for hypogonadism. Has been on 100mg weekly since September 2020 with noticeable improvement of energy.    Patient overall happy with results and wishes to continue.  Polycythemia noted on prior labs, most recent improved.   E levels have been elevated but stable, no anastrozole.  Last draw was day prior to next injection.     Viagra working well, no refill needed.  No voiding complaints.     Component      Latest Ref Rng 10/30/2023 5/6/2024 1/25/2025   WBC      4.0 - 11.0 x10(3) uL 14.0 (H)  6.9  8.0    RBC      4.30 - 5.70 x10(6)uL 5.82 (H)  5.47  5.62    Hemoglobin      13.0 - 17.5 g/dL 18.8 (H)  17.5  17.6 (H)    Hematocrit      39.0 - 53.0 % 53.8 (H)  50.8  52.2    Platelet Count      150.0 - 450.0 10(3)uL 357.0  369.0  395.0    MCV      80.0 - 100.0 fL 92.4  92.9  92.9    MCH      26.0 - 34.0 pg 32.3  32.0  31.3    MCHC      31.0 - 37.0 g/dL 34.9  34.4  33.7    RDW      % 12.1  12.1  12.6    Prelim Neutrophil Abs      1.50 - 7.70 x10 (3) uL 10.25 (H)   4.84    Neutrophils Absolute      1.50 - 7.70 x10(3) uL 10.25 (H)   4.84    Lymphocytes Absolute      1.00 - 4.00 x10(3) uL 2.36   1.98    Monocytes Absolute      0.10 - 1.00 x10(3) uL 1.16 (H)   0.71    Eosinophils Absolute      0.00 - 0.70 x10(3) uL 0.09   0.33    Basophils Absolute      0.00 - 0.20 x10(3) uL 0.09   0.13    Immature Granulocyte Absolute      0.00 - 1.00 x10(3) uL 0.03    0.05    Neutrophils %      % 73.4   60.3    Lymphocytes %      % 16.9   24.6    Monocytes %      % 8.3   8.8    Eosinophils %      % 0.6   4.1    Basophils %      % 0.6   1.6    Immature Granulocyte %      % 0.2   0.6       Component      Latest Ref Rng 1/9/2023 7/10/2023 5/6/2024 1/25/2025   TESTOSTERONE      187..19 ng/dL ng/dL 909.33 (H)  380.28  525.91  306.29         History/Other:    No Further Nursing Notes to Review         Current Outpatient Medications   Medication Sig Dispense Refill    cyanocobalamin 1000 MCG/ML Injection Solution 1000 MCG twice monthly for 3 months 6 mL 0    testosterone cypionate 200 mg/mL Intramuscular Solution Inject 0.5 mL (100 mg total) into the muscle every 7 days. No additional refills until labs and office visit completed. Please call office to schedule. 2 mL 0    Syringe/Needle, Disp, 22G X 1\" 3 ML Does not apply Misc Use needle/syringe to inject testosterone as instructed every week. 24 each 1    tadalafil (CIALIS) 5 MG Oral Tab Take 1 tablet (5 mg total) by mouth daily as needed for Erectile Dysfunction. 90 tablet 5       Review of Systems:  Pertinent items are noted in HPI.      Objective:   There were no vitals taken for this visit. Estimated body mass index is 31.15 kg/m² as calculated from the following:    Height as of 6/10/24: 5' 6\" (1.676 m).    Weight as of 6/10/24: 193 lb (87.5 kg).    GENERAL: well-developed, well-nourished, in no acute distress  NEUROLOGIC: alert and oriented, normal speech, no focal neurologic deficits  EYES: sclera non-icteric  NECK: without obvious goiter or masses  LUNGS: nonlabored breathing      Laboratory Data:  Lab Results   Component Value Date    WBC 8.0 01/25/2025    HGB 17.6 (H) 01/25/2025    .0 01/25/2025     Lab Results   Component Value Date     01/25/2025    K 4.5 01/25/2025     01/25/2025    CO2 30.0 01/25/2025    BUN 12 01/25/2025    GLU 96 01/25/2025    GFRAA 82 02/19/2021    AST 21 01/25/2025    ALT 28  01/25/2025    TP 7.6 01/25/2025    ALB 4.7 01/25/2025    CA 9.9 01/25/2025       Urinalysis Results (last three years):  Recent Labs     01/09/23  1748 10/30/23  1214 10/30/23  1426   COLORUR  --   --  Yellow   CLARITY  --   --  Clear   SPECGRAVITY 1.025 1.020 >=1.030   PHURINE 7.0 6.0 6.0   PROUR  --   --  30 mg/dL*   GLUUR  --   --  Negative   KETUR  --   --  Negative   BILUR  --   --  Negative   BLOODURINE  --   --  Small*   NITRITE Negative Negative Negative   UROBILINOGEN  --   --  1.0*   LEUUR  --   --  Negative   WBCUR  --   --  None Seen   RBCUR  --   --  6-10*   BACUR  --   --  Rare*       Urine Culture Results (last three years):  No results found for: \"URINECUL\"    Imaging  No results found.    Assessment & Plan:   Hypogonadism    Continue 100mg weekly at this time.  Repeat labs in 3 months to monitor blood counts  OV anticipated in 9 mos with repeat labs prior if 3 mo check stable    Saira Walker PA-C

## 2025-02-26 DIAGNOSIS — E53.8 B12 DEFICIENCY: ICD-10-CM

## 2025-02-26 RX ORDER — AMOXICILLIN 500 MG/1
CAPSULE ORAL
COMMUNITY
Start: 2025-02-18

## 2025-02-26 RX ORDER — CYANOCOBALAMIN 1000 UG/ML
INJECTION, SOLUTION INTRAMUSCULAR; SUBCUTANEOUS
Qty: 6 ML | Refills: 0 | Status: SHIPPED | OUTPATIENT
Start: 2025-02-26

## 2025-02-26 RX ORDER — TRAMADOL HYDROCHLORIDE 50 MG/1
TABLET ORAL
COMMUNITY
Start: 2025-02-18

## 2025-03-18 DIAGNOSIS — E29.1 HYPOGONADISM IN MALE: ICD-10-CM

## 2025-03-19 RX ORDER — TESTOSTERONE CYPIONATE 200 MG/ML
100 INJECTION, SOLUTION INTRAMUSCULAR
Qty: 4 ML | Refills: 1 | Status: SHIPPED | OUTPATIENT
Start: 2025-03-19 | End: 2025-05-21

## 2025-04-21 DIAGNOSIS — E53.8 B12 DEFICIENCY: ICD-10-CM

## 2025-04-23 NOTE — TELEPHONE ENCOUNTER
Please review;  [facility] protocol failed/ No protocol     Last office visit = 5/20/2024   Last refill = 3/1/2025    No future appointments.  No recent nor  future labs noted for Vitamin B 12         Requested Prescriptions   Pending Prescriptions Disp Refills    cyanocobalamin 1000 MCG/ML Injection Solution 6 mL 0     Sig: INJECT 1 ML (CC) INTRAMUSCULARLY TWICE EVERY MONTH       There is no refill protocol information for this order          Recent Outpatient Visits              2 months ago Hypogonadism in male    [facility], [facility] Saira Mcgill PA-C    Telemedicine    10 months ago Suspected COVID-19 virus infection    [facility], [facility] [place] [address], [place] Jazmin Chatman APRN    Office Visit    11 months ago Routine general medical examination at health care facility    [address] Gunnar Hurd MD    Office Visit    11 months ago Hypogonadism in male    [facility] Arslan Garg MD    Office Visit    1 year ago Influenza-like illness    [facility], [facility], [place] [id number], [place] Tori Roth NP    Office Visit

## 2025-04-24 RX ORDER — CYANOCOBALAMIN 1000 UG/ML
INJECTION, SOLUTION INTRAMUSCULAR; SUBCUTANEOUS
Qty: 6 ML | Refills: 0 | Status: SHIPPED | OUTPATIENT
Start: 2025-04-24

## 2025-05-21 DIAGNOSIS — E29.1 HYPOGONADISM IN MALE: ICD-10-CM

## 2025-05-21 RX ORDER — TESTOSTERONE CYPIONATE 200 MG/ML
100 INJECTION, SOLUTION INTRAMUSCULAR
Qty: 4 ML | Refills: 1 | Status: SHIPPED | OUTPATIENT
Start: 2025-05-21 | End: 2025-08-14

## 2025-06-16 ENCOUNTER — TELEPHONE (OUTPATIENT)
Dept: FAMILY MEDICINE CLINIC | Facility: CLINIC | Age: 55
End: 2025-06-16

## 2025-06-16 DIAGNOSIS — Z00.00 LABORATORY EXAMINATION ORDERED AS PART OF A ROUTINE GENERAL MEDICAL EXAMINATION: Primary | ICD-10-CM

## 2025-06-16 DIAGNOSIS — Z12.5 SCREENING FOR PROSTATE CANCER: ICD-10-CM

## 2025-06-16 NOTE — TELEPHONE ENCOUNTER
Annual labs ordered per protocol per Pt's request. [employer] message sent to Pt to notify.

## 2025-06-24 ENCOUNTER — LAB ENCOUNTER (OUTPATIENT)
Dept: LAB | Age: 55
End: 2025-06-24
Attending: FAMILY MEDICINE
Payer: COMMERCIAL

## 2025-06-24 DIAGNOSIS — E29.1 HYPOGONADISM IN MALE: ICD-10-CM

## 2025-06-24 DIAGNOSIS — Z00.00 LABORATORY EXAMINATION ORDERED AS PART OF A ROUTINE GENERAL MEDICAL EXAMINATION: ICD-10-CM

## 2025-06-24 DIAGNOSIS — Z12.5 SCREENING FOR PROSTATE CANCER: ICD-10-CM

## 2025-06-24 DIAGNOSIS — Z00.00 ROUTINE GENERAL MEDICAL EXAMINATION AT HEALTH CARE FACILITY: ICD-10-CM

## 2025-06-24 LAB
ALBUMIN SERPL-MCNC: 4.9 G/DL (ref 3.2–4.8)
ALBUMIN/GLOB SERPL: 2.1 {RATIO} (ref 1–2)
ALP LIVER SERPL-CCNC: 66 U/L (ref 45–117)
ALT SERPL-CCNC: 19 U/L (ref 10–49)
ANION GAP SERPL CALC-SCNC: 10 MMOL/L (ref 0–18)
AST SERPL-CCNC: 18 U/L (ref ?–34)
BASOPHILS # BLD AUTO: 0.06 X10(3) UL (ref 0–0.2)
BASOPHILS NFR BLD AUTO: 1 %
BILIRUB SERPL-MCNC: 0.7 MG/DL (ref 0.3–1.2)
BUN BLD-MCNC: 12 MG/DL (ref 9–23)
CALCIUM BLD-MCNC: 9.4 MG/DL (ref 8.7–10.6)
CHLORIDE SERPL-SCNC: 103 MMOL/L (ref 98–112)
CHOLEST SERPL-MCNC: 226 MG/DL (ref ?–200)
CO2 SERPL-SCNC: 27 MMOL/L (ref 21–32)
COMPLEXED PSA SERPL-MCNC: 1.82 NG/ML (ref ?–4)
CREAT BLD-MCNC: 1.19 MG/DL (ref 0.7–1.3)
EGFRCR SERPLBLD CKD-EPI 2021: 73 ML/MIN/1.73M2 (ref 60–?)
EOSINOPHIL # BLD AUTO: 0.14 X10(3) UL (ref 0–0.7)
EOSINOPHIL NFR BLD AUTO: 2.4 %
ERYTHROCYTE [DISTWIDTH] IN BLOOD BY AUTOMATED COUNT: 12.5 %
FASTING PATIENT LIPID ANSWER: YES
FASTING STATUS PATIENT QL REPORTED: YES
GLOBULIN PLAS-MCNC: 2.3 G/DL (ref 2–3.5)
GLUCOSE BLD-MCNC: 111 MG/DL (ref 70–99)
HCT VFR BLD AUTO: 54.6 % (ref 39–53)
HDLC SERPL-MCNC: 35 MG/DL (ref 40–59)
HGB BLD-MCNC: 18.8 G/DL (ref 13–17.5)
IMM GRANULOCYTES # BLD AUTO: 0.02 X10(3) UL (ref 0–1)
IMM GRANULOCYTES NFR BLD: 0.3 %
LDLC SERPL CALC-MCNC: 168 MG/DL (ref ?–100)
LYMPHOCYTES # BLD AUTO: 2.12 X10(3) UL (ref 1–4)
LYMPHOCYTES NFR BLD AUTO: 36.6 %
MCH RBC QN AUTO: 31.7 PG (ref 26–34)
MCHC RBC AUTO-ENTMCNC: 34.4 G/DL (ref 31–37)
MCV RBC AUTO: 92.1 FL (ref 80–100)
MONOCYTES # BLD AUTO: 0.59 X10(3) UL (ref 0.1–1)
MONOCYTES NFR BLD AUTO: 10.2 %
NEUTROPHILS # BLD AUTO: 2.86 X10 (3) UL (ref 1.5–7.7)
NEUTROPHILS # BLD AUTO: 2.86 X10(3) UL (ref 1.5–7.7)
NEUTROPHILS NFR BLD AUTO: 49.5 %
NONHDLC SERPL-MCNC: 191 MG/DL (ref ?–130)
OSMOLALITY SERPL CALC.SUM OF ELEC: 290 MOSM/KG (ref 275–295)
PLATELET # BLD AUTO: 341 10(3)UL (ref 150–450)
POTASSIUM SERPL-SCNC: 4.1 MMOL/L (ref 3.5–5.1)
PROT SERPL-MCNC: 7.2 G/DL (ref 5.7–8.2)
RBC # BLD AUTO: 5.93 X10(6)UL (ref 4.3–5.7)
SODIUM SERPL-SCNC: 140 MMOL/L (ref 136–145)
TESTOST SERPL-MCNC: 826 NG/DL (ref 187.72–684.19)
TRIGL SERPL-MCNC: 124 MG/DL (ref 30–149)
TSI SER-ACNC: 0.82 UIU/ML (ref 0.55–4.78)
VLDLC SERPL CALC-MCNC: 25 MG/DL (ref 0–30)
WBC # BLD AUTO: 5.8 X10(3) UL (ref 4–11)

## 2025-06-24 PROCEDURE — 36415 COLL VENOUS BLD VENIPUNCTURE: CPT

## 2025-06-24 PROCEDURE — 85025 COMPLETE CBC W/AUTO DIFF WBC: CPT

## 2025-06-24 PROCEDURE — 80053 COMPREHEN METABOLIC PANEL: CPT

## 2025-06-24 PROCEDURE — 84403 ASSAY OF TOTAL TESTOSTERONE: CPT

## 2025-06-24 PROCEDURE — 84443 ASSAY THYROID STIM HORMONE: CPT

## 2025-06-24 PROCEDURE — 80061 LIPID PANEL: CPT

## 2025-06-25 ENCOUNTER — TELEPHONE (OUTPATIENT)
Dept: SURGERY | Facility: CLINIC | Age: 55
End: 2025-06-25

## 2025-06-25 ENCOUNTER — OFFICE VISIT (OUTPATIENT)
Dept: FAMILY MEDICINE CLINIC | Facility: CLINIC | Age: 55
End: 2025-06-25
Payer: COMMERCIAL

## 2025-06-25 VITALS
HEIGHT: 66 IN | BODY MASS INDEX: 33.11 KG/M2 | SYSTOLIC BLOOD PRESSURE: 110 MMHG | HEART RATE: 96 BPM | WEIGHT: 206 LBS | DIASTOLIC BLOOD PRESSURE: 80 MMHG | RESPIRATION RATE: 16 BRPM | OXYGEN SATURATION: 98 %

## 2025-06-25 DIAGNOSIS — F43.10 PTSD (POST-TRAUMATIC STRESS DISORDER): ICD-10-CM

## 2025-06-25 DIAGNOSIS — E78.2 MIXED HYPERLIPIDEMIA: ICD-10-CM

## 2025-06-25 DIAGNOSIS — Z00.00 ANNUAL PHYSICAL EXAM: Primary | ICD-10-CM

## 2025-06-25 DIAGNOSIS — E29.1 HYPOGONADISM IN MALE: ICD-10-CM

## 2025-06-25 NOTE — PROGRESS NOTES
The following individual(s) verbally consented to be recorded using ambient AI listening technology and understand that they can each withdraw their consent to this listening technology at any point by asking the clinician to turn off or pause the recording:    Patient name: Audi Clark  Additional names:

## 2025-06-25 NOTE — TELEPHONE ENCOUNTER
Talked to pt and scheduled with [facility].   Future Appointments   Date Time Provider Department Center   6/25/2025  2:40 PM [facility]   6/30/2025  4:45 PM Arslan Garg MD [id number]     This encounter is now closed.

## 2025-06-26 DIAGNOSIS — E53.8 B12 DEFICIENCY: ICD-10-CM

## 2025-06-26 NOTE — PROGRESS NOTES
HPI:    Audi Clark is a 54 year old male who presents for Wellness Visit (Reviewed Preventative/Wellness form with patient./)     History of Present Illness  Audi Clark is a 54 year old male who presents for a follow-up regarding testosterone levels and weight management.    He has been experiencing difficulty losing weight despite regular workouts, exercising three to five days a week, burning approximately 500 calories per session, and occasionally 400 calories during lighter workouts. Since November, he has been lifting weights and has noticed an increase in muscle mass. He attributes some of his weight gain to his diet, mentioning a fondness for sweets and carbohydrates, particularly tortillas. He is trying to moderate his intake,    He has been on testosterone therapy for a long time and has noticed an increase in his testosterone levels, which are currently at 800. He takes his testosterone shots regularly and has become more disciplined in his routine. He questions whether his lifting regimen could be contributing to his increased testosterone levels. He has a history of high testosterone levels and is scheduled to see his urologist on Monday.    His blood pressure has been stable, and his cholesterol levels are slightly higher than desired. He has a sweet tooth but has not developed diabetes, although his blood sugar levels have been creeping up. He wants to reduce his alcohol intake, especially on days he plans to work out.         Past History:   He  has a past medical history of Anxiety, Back pain, Bad breath, Chest pain (sometimes), Decorative tattoo, Depression, Diarrhea, unspecified (on and off this past week), Fatigue (past year at least), Food intolerance (young age / 20's), Heartburn (march3), Sleep apnea, Sleep disturbance, Stress, and Wears glasses.   He  has a past surgical history that includes Brain Surgery; colonoscopy (2021); tonsillectomy (1980); and vasectomy (2015).   His  family history includes Asthma in his mother; Cancer in his father; Diabetes in his brother and mother; Prostate Cancer in his father.   He  reports that he has been smoking cigarettes. He has been exposed to tobacco smoke. He has never used smokeless tobacco. He reports current alcohol use of about 5.0 standard drinks of alcohol per week. He reports that he does not use drugs.     He is not on any long-term medications.   He has no known allergies.   Medications Ordered Prior to this Encounter[1]      REVIEW OF SYSTEMS:   Patient denies shortness of breath, denies chest pain and denies any recent fevers or chills.    Patient reports no urinary complaints and denies headaches or visual disturbances.   Patient denies any abdominal pain at this time. Patient has no new skin lesions.  Patient reports no acute back pain and reports no dizziness or headaches.   Patient reports no visual disturbances and reports hearing has been about the same.   Patient reports no recent injury or trauma.               EXAM:    /80   Pulse 96   Resp 16   Ht 5' 6\" (1.676 m)   Wt 206 lb (93.4 kg)   SpO2 98%   BMI 33.25 kg/m²  Estimated body mass index is 33.25 kg/m² as calculated from the following:    Height as of this encounter: 5' 6\" (1.676 m).    Weight as of this encounter: 206 lb (93.4 kg).    General Appearance:  Alert, cooperative, no distress, appears stated age   Head:  Normocephalic, without obvious abnormality, atraumatic   Eyes:  conjunctiva/cornea is not erythematous.        Nose: No nasal drainage.    Throat: No erythema    Neck: Supple, symmetrical, trachea midline, and normal ROM  thyroid: no obvious nodules   Back:   Symmetric, no curvature, ROM normal, no CVA tenderness   Lungs:   Clear to auscultation bilaterally, respirations unlabored   Chest Wall:  No tenderness or deformity   Heart:  Regular rate and rhythm, S1, S2 normal, no murmur,   Abdomen:   Soft, non-tender, bowel sounds active. No hernia.     Genitalia:     Rectal:     Extremities: Extremities normal, atraumatic, no cyanosis or edema   Pulses: 2+ and symmetric   Skin: Skin color, texture, turgor normal, no new rashes    Lymph nodes: No obvious cervical adenopathy.    Neurologic and psych: Normal speech, Alert and oriented x 3.   Normal mood, normal insight and judgment.          Assessment & Plan  Elevated testosterone levels  Testosterone levels are elevated at 800, likely due to testosterone supplementation and regular weightlifting, contributing to increased muscle mass.  - Follow up with urologist on Monday to discuss testosterone levels and potential adjustment of supplementation.    Weight management  Despite consistent weightlifting and burning approximately 500 calories per session, significant weight loss has not occurred, likely due to increased muscle mass and dietary habits, including a preference for sweets and carbohydrates.  - Monitor carbohydrate and sweet intake.  - Increase protein and vegetable consumption.  - Incorporate 10-15 minutes of aerobic exercise, such as treadmill walking with an incline, to boost energy and reduce abdominal fat.  - Consider using an maurice to track food intake and exercise.    Elevated blood sugar levels  Blood sugar levels have increased slightly but are not at a diabetic level. Current lifestyle changes, including regular exercise and dietary adjustments, are positive steps towards preventing diabetes.  - Recheck blood sugar levels in December to monitor for any further changes.    General health maintenance  Colonoscopy is up to date, next due in 2031. Prostate health is good. Advised to maintain a healthy lifestyle, including regular exercise and a balanced diet, to support overall health and prevent chronic conditions.  - Continue regular exercise routine with added aerobic activity.  - Maintain a balanced diet with reduced carbohydrate and sweet intake.  - Ensure adequate sleep and recovery to manage  weight and stress levels.             ASSESSMENT AND PLAN:   Diagnoses and all orders for this visit:    Annual physical exam    Mixed hyperlipidemia  -     Comp Metabolic Panel (14) [E]; Future  -     Lipid Panel [E]; Future    PTSD (post-traumatic stress disorder)    Hypogonadism in male           Gunnar Hurd MD, 6/25/2025, 10:57 PM     Note to patient: The 21st Century Cures Act makes medical notes like these available to patients in the interest of transparency. However, this is a medical document intended as peer to peer communication. It is written in medical language and may contain abbreviations or verbiage that are unfamiliar. It may appear blunt or direct. Medical documents are intended to carry relevant information, facts as evident, and the clinical opinion of the practitioner who signs the document.        [1]   Current Outpatient Medications on File Prior to Visit   Medication Sig    Syringe/Needle, Disp, 22G X 1\" 3 ML Does not apply Misc Use needle/syringe to inject testosterone as instructed every week.    testosterone cypionate 200 mg/mL Intramuscular Solution Inject 0.5 mL (100 mg total) into the muscle every 7 days. Repeat labs due in NOW - please complete to avoid delay in additional refills    cyanocobalamin 1000 MCG/ML Injection Solution INJECT 1 ML (CC) INTRAMUSCULARLY TWICE EVERY MONTH    traMADol 50 MG Oral Tab     tadalafil (CIALIS) 5 MG Oral Tab Take 1 tablet (5 mg total) by mouth daily as needed for Erectile Dysfunction.     No current facility-administered medications on file prior to visit.

## 2025-06-30 RX ORDER — CYANOCOBALAMIN 1000 UG/ML
INJECTION, SOLUTION INTRAMUSCULAR; SUBCUTANEOUS
Qty: 6 ML | Refills: 1 | Status: SHIPPED | OUTPATIENT
Start: 2025-06-30

## 2025-08-14 ENCOUNTER — OFFICE VISIT (OUTPATIENT)
Dept: SURGERY | Facility: CLINIC | Age: 55
End: 2025-08-14

## 2025-08-14 DIAGNOSIS — N40.1 BPH WITH OBSTRUCTION/LOWER URINARY TRACT SYMPTOMS: ICD-10-CM

## 2025-08-14 DIAGNOSIS — E29.1 HYPOGONADISM IN MALE: ICD-10-CM

## 2025-08-14 DIAGNOSIS — D75.1 POLYCYTHEMIA: Primary | ICD-10-CM

## 2025-08-14 DIAGNOSIS — N13.8 BPH WITH OBSTRUCTION/LOWER URINARY TRACT SYMPTOMS: ICD-10-CM

## 2025-08-14 LAB
APPEARANCE: CLEAR
BILIRUBIN: NEGATIVE
GLUCOSE (URINE DIPSTICK): NEGATIVE MG/DL
LEUKOCYTES: NEGATIVE
MULTISTIX LOT#: ABNORMAL NUMERIC
NITRITE, URINE: NEGATIVE
PH, URINE: 5.5 (ref 4.5–8)
PROTEIN (URINE DIPSTICK): NEGATIVE MG/DL
SPECIFIC GRAVITY: 1.02 (ref 1–1.03)
URINE-COLOR: YELLOW
UROBILINOGEN,SEMI-QN: 0.2 MG/DL (ref 0–1.9)

## 2025-08-14 PROCEDURE — 99213 OFFICE O/P EST LOW 20 MIN: CPT | Performed by: PHYSICIAN ASSISTANT

## 2025-08-14 PROCEDURE — 81003 URINALYSIS AUTO W/O SCOPE: CPT | Performed by: PHYSICIAN ASSISTANT

## 2025-08-15 RX ORDER — TESTOSTERONE CYPIONATE 200 MG/ML
100 INJECTION, SOLUTION INTRAMUSCULAR
Qty: 4 ML | Refills: 1 | Status: SHIPPED | OUTPATIENT
Start: 2025-08-15

## 2025-08-15 RX ORDER — TADALAFIL 5 MG/1
5 TABLET ORAL
Qty: 90 TABLET | Refills: 5 | Status: SHIPPED | OUTPATIENT
Start: 2025-08-15

## 2025-08-20 ENCOUNTER — NURSE ONLY (OUTPATIENT)
Facility: LOCATION | Age: 55
End: 2025-08-20
Attending: PHYSICIAN ASSISTANT

## 2025-08-20 VITALS
RESPIRATION RATE: 16 BRPM | HEART RATE: 103 BPM | WEIGHT: 203.19 LBS | SYSTOLIC BLOOD PRESSURE: 120 MMHG | BODY MASS INDEX: 33 KG/M2 | TEMPERATURE: 97 F | OXYGEN SATURATION: 97 % | DIASTOLIC BLOOD PRESSURE: 77 MMHG

## 2025-08-20 DIAGNOSIS — D75.1 POLYCYTHEMIA: ICD-10-CM

## 2025-08-20 LAB
BASOPHILS # BLD AUTO: 0.09 X10(3) UL (ref 0–0.2)
BASOPHILS NFR BLD AUTO: 1.5 %
EOSINOPHIL # BLD AUTO: 0.24 X10(3) UL (ref 0–0.7)
EOSINOPHIL NFR BLD AUTO: 3.9 %
ERYTHROCYTE [DISTWIDTH] IN BLOOD BY AUTOMATED COUNT: 12.3 %
HCT VFR BLD AUTO: 47.1 % (ref 39–53)
HGB BLD-MCNC: 16.8 G/DL (ref 13–17.5)
IMM GRANULOCYTES # BLD AUTO: 0.01 X10(3) UL (ref 0–1)
IMM GRANULOCYTES NFR BLD: 0.2 %
LYMPHOCYTES # BLD AUTO: 2.34 X10(3) UL (ref 1–4)
LYMPHOCYTES NFR BLD AUTO: 37.7 %
MCH RBC QN AUTO: 31.3 PG (ref 26–34)
MCHC RBC AUTO-ENTMCNC: 35.7 G/DL (ref 31–37)
MCV RBC AUTO: 87.7 FL (ref 80–100)
MONOCYTES # BLD AUTO: 0.56 X10(3) UL (ref 0.1–1)
MONOCYTES NFR BLD AUTO: 9 %
NEUTROPHILS # BLD AUTO: 2.96 X10 (3) UL (ref 1.5–7.7)
NEUTROPHILS # BLD AUTO: 2.96 X10(3) UL (ref 1.5–7.7)
NEUTROPHILS NFR BLD AUTO: 47.7 %
PLATELET # BLD AUTO: 338 10(3)UL (ref 150–450)
RBC # BLD AUTO: 5.37 X10(6)UL (ref 4.3–5.7)
WBC # BLD AUTO: 6.2 X10(3) UL (ref 4–11)

## (undated) DIAGNOSIS — E53.8 B12 DEFICIENCY: ICD-10-CM

## (undated) DIAGNOSIS — E29.1 HYPOGONADISM IN MALE: ICD-10-CM

## (undated) DIAGNOSIS — M75.81 RIGHT ROTATOR CUFF TENDONITIS: ICD-10-CM

## (undated) DIAGNOSIS — N52.9 ERECTILE DYSFUNCTION, UNSPECIFIED ERECTILE DYSFUNCTION TYPE: ICD-10-CM

## (undated) DIAGNOSIS — S46.011A STRAIN OF MUSC/TEND THE ROTATOR CUFF OF RIGHT SHOULDER, INIT: Primary | ICD-10-CM

## (undated) DIAGNOSIS — M62.838 TRAPEZIUS MUSCLE SPASM: ICD-10-CM

## (undated) NOTE — LETTER
05/26/21        Abiel Romero  [facility]      Dear Lee Norton records indicate that you have outstanding lab work and or testing that was ordered for you and has not yet been completed:    -----Vitamin B12 level    P

## (undated) NOTE — LETTER
Date: 3/14/2022    Patient Name: David Love    1970          To Whom it may concern: The above patient was seen at the [facility] for treatment of a medical condition. This patient should be able to do only light duty form 3/9/2022 through 3/23/2022.     Sincerely,    Leona Walsh MD